# Patient Record
Sex: MALE | Race: WHITE | Employment: FULL TIME | URBAN - METROPOLITAN AREA
[De-identification: names, ages, dates, MRNs, and addresses within clinical notes are randomized per-mention and may not be internally consistent; named-entity substitution may affect disease eponyms.]

---

## 2018-03-02 LAB — ANTIBODY: NONREACTIVE

## 2019-05-15 ENCOUNTER — HOSPITAL ENCOUNTER (INPATIENT)
Age: 59
LOS: 2 days | Discharge: HOME | DRG: 249 | End: 2019-05-17
Attending: EMERGENCY MEDICINE | Admitting: INTERNAL MEDICINE
Payer: COMMERCIAL

## 2019-05-15 ENCOUNTER — APPOINTMENT (OUTPATIENT)
Dept: GENERAL RADIOLOGY | Age: 59
DRG: 249 | End: 2019-05-15
Payer: COMMERCIAL

## 2019-05-15 ENCOUNTER — APPOINTMENT (OUTPATIENT)
Dept: CARDIAC CATH/INVASIVE PROCEDURES | Age: 59
DRG: 249 | End: 2019-05-15
Payer: COMMERCIAL

## 2019-05-15 DIAGNOSIS — I21.3 ST ELEVATION MYOCARDIAL INFARCTION (STEMI), UNSPECIFIED ARTERY (HCC): Primary | ICD-10-CM

## 2019-05-15 LAB
ALBUMIN SERPL-MCNC: 4 G/DL (ref 3.4–5)
ALP BLD-CCNC: 81 U/L (ref 40–129)
ALT SERPL-CCNC: 63 U/L (ref 10–40)
AMPHETAMINE SCREEN, URINE: ABNORMAL
ANION GAP SERPL CALCULATED.3IONS-SCNC: 17 MMOL/L (ref 3–16)
AST SERPL-CCNC: 60 U/L (ref 15–37)
B-TYPE NATRIURETIC PEPTIDE: 96 PG/ML (ref 0–99.9)
BACTERIA: ABNORMAL /HPF
BARBITURATE SCREEN URINE: ABNORMAL
BASE EXCESS VENOUS: -10 (ref -3–3)
BASE EXCESS VENOUS: -5 (ref -3–3)
BASE EXCESS VENOUS: -7 (ref -3–3)
BASOPHILS ABSOLUTE: 0 K/UL (ref 0–0.2)
BASOPHILS ABSOLUTE: 0 K/UL (ref 0–0.2)
BASOPHILS RELATIVE PERCENT: 0 %
BASOPHILS RELATIVE PERCENT: 0.2 %
BENZODIAZEPINE SCREEN, URINE: ABNORMAL
BILIRUB SERPL-MCNC: 0.4 MG/DL (ref 0–1)
BILIRUBIN DIRECT: <0.2 MG/DL (ref 0–0.3)
BILIRUBIN URINE: NEGATIVE
BILIRUBIN, INDIRECT: ABNORMAL MG/DL (ref 0–1)
BLOOD, URINE: ABNORMAL
BUN BLDV-MCNC: 27 MG/DL (ref 7–20)
CALCIUM IONIZED: 1.13 MMOL/L (ref 1.12–1.32)
CALCIUM IONIZED: 1.13 MMOL/L (ref 1.12–1.32)
CALCIUM IONIZED: 1.16 MMOL/L (ref 1.12–1.32)
CALCIUM SERPL-MCNC: 8.2 MG/DL (ref 8.3–10.6)
CANNABINOID SCREEN URINE: ABNORMAL
CHLORIDE BLD-SCNC: 102 MMOL/L (ref 99–110)
CHOLESTEROL, TOTAL: 146 MG/DL (ref 0–199)
CLARITY: CLEAR
CO2: 17 MMOL/L (ref 21–32)
CO2: 21 MMOL/L (ref 21–32)
COCAINE METABOLITE SCREEN URINE: POSITIVE
COLOR: YELLOW
CREAT SERPL-MCNC: 2.3 MG/DL (ref 0.9–1.3)
CREATININE URINE: 176.3 MG/DL (ref 39–259)
EKG ATRIAL RATE: 94 BPM
EKG DIAGNOSIS: NORMAL
EKG P AXIS: 66 DEGREES
EKG P-R INTERVAL: 162 MS
EKG Q-T INTERVAL: 350 MS
EKG QRS DURATION: 94 MS
EKG QTC CALCULATION (BAZETT): 437 MS
EKG R AXIS: 68 DEGREES
EKG T AXIS: 81 DEGREES
EKG VENTRICULAR RATE: 94 BPM
EOSINOPHILS ABSOLUTE: 0 K/UL (ref 0–0.6)
EOSINOPHILS ABSOLUTE: 0 K/UL (ref 0–0.6)
EOSINOPHILS RELATIVE PERCENT: 0 %
EOSINOPHILS RELATIVE PERCENT: 0 %
EPITHELIAL CELLS, UA: ABNORMAL /HPF
GFR AFRICAN AMERICAN: 25
GFR AFRICAN AMERICAN: 35
GFR NON-AFRICAN AMERICAN: 21
GFR NON-AFRICAN AMERICAN: 29
GLUCOSE BLD-MCNC: 114 MG/DL (ref 70–99)
GLUCOSE BLD-MCNC: 114 MG/DL (ref 70–99)
GLUCOSE BLD-MCNC: 117 MG/DL (ref 70–99)
GLUCOSE BLD-MCNC: 180 MG/DL (ref 70–99)
GLUCOSE URINE: NEGATIVE MG/DL
HCO3 VENOUS: 19.6 MMOL/L (ref 23–29)
HCO3 VENOUS: 19.9 MMOL/L (ref 23–29)
HCO3 VENOUS: 21.6 MMOL/L (ref 23–29)
HCT VFR BLD CALC: 36.5 % (ref 40.5–52.5)
HCT VFR BLD CALC: 42.2 % (ref 40.5–52.5)
HCT VFR BLD CALC: 48.8 % (ref 40.5–52.5)
HDLC SERPL-MCNC: 36 MG/DL (ref 40–60)
HEMOGLOBIN: 11.9 G/DL (ref 13.5–17.5)
HEMOGLOBIN: 13.4 G/DL (ref 13.5–17.5)
HEMOGLOBIN: 15.2 G/DL (ref 13.5–17.5)
KETONES, URINE: ABNORMAL MG/DL
LACTATE: 1.45 MMOL/L (ref 0.4–2)
LACTATE: 10.4 MMOL/L (ref 0.4–2)
LACTATE: 2.5 MMOL/L (ref 0.4–2)
LDL CHOLESTEROL CALCULATED: 102 MG/DL
LEUKOCYTE ESTERASE, URINE: NEGATIVE
LV EF: 23 %
LVEF MODALITY: NORMAL
LYMPHOCYTES ABSOLUTE: 0.5 K/UL (ref 1–5.1)
LYMPHOCYTES ABSOLUTE: 0.8 K/UL (ref 1–5.1)
LYMPHOCYTES RELATIVE PERCENT: 2 %
LYMPHOCYTES RELATIVE PERCENT: 4.2 %
Lab: ABNORMAL
MAGNESIUM: 2 MG/DL (ref 1.8–2.4)
MCH RBC QN AUTO: 29.2 PG (ref 26–34)
MCH RBC QN AUTO: 29.3 PG (ref 26–34)
MCH RBC QN AUTO: 29.5 PG (ref 26–34)
MCHC RBC AUTO-ENTMCNC: 31.1 G/DL (ref 31–36)
MCHC RBC AUTO-ENTMCNC: 31.9 G/DL (ref 31–36)
MCHC RBC AUTO-ENTMCNC: 32.5 G/DL (ref 31–36)
MCV RBC AUTO: 90.1 FL (ref 80–100)
MCV RBC AUTO: 91.5 FL (ref 80–100)
MCV RBC AUTO: 94.6 FL (ref 80–100)
METHADONE SCREEN, URINE: ABNORMAL
MICROSCOPIC EXAMINATION: YES
MONOCYTES ABSOLUTE: 1.4 K/UL (ref 0–1.3)
MONOCYTES ABSOLUTE: 2.2 K/UL (ref 0–1.3)
MONOCYTES RELATIVE PERCENT: 7.4 %
MONOCYTES RELATIVE PERCENT: 9 %
NEUTROPHILS ABSOLUTE: 16.6 K/UL (ref 1.7–7.7)
NEUTROPHILS ABSOLUTE: 21.7 K/UL (ref 1.7–7.7)
NEUTROPHILS RELATIVE PERCENT: 88.2 %
NEUTROPHILS RELATIVE PERCENT: 89 %
NITRITE, URINE: NEGATIVE
O2 SAT, VEN: 23 %
O2 SAT, VEN: 76 %
O2 SAT, VEN: 83 %
OPIATE SCREEN URINE: ABNORMAL
OXYCODONE URINE: ABNORMAL
PCO2, VEN: 42.8 MM HG (ref 40–50)
PCO2, VEN: 46.2 MM HG (ref 40–50)
PCO2, VEN: 65.5 MM HG (ref 40–50)
PDW BLD-RTO: 13.7 % (ref 12.4–15.4)
PDW BLD-RTO: 13.9 % (ref 12.4–15.4)
PDW BLD-RTO: 15 % (ref 12.4–15.4)
PERFORMED ON: ABNORMAL
PERFORMED ON: NORMAL
PH UA: 6
PH UA: 6 (ref 5–8)
PH VENOUS: 7.08 (ref 7.35–7.45)
PH VENOUS: 7.28 (ref 7.35–7.45)
PH VENOUS: 7.28 (ref 7.35–7.45)
PHENCYCLIDINE SCREEN URINE: ABNORMAL
PLATELET # BLD: 246 K/UL (ref 135–450)
PLATELET # BLD: 305 K/UL (ref 135–450)
PLATELET # BLD: 393 K/UL (ref 135–450)
PMV BLD AUTO: 7.1 FL (ref 5–10.5)
PMV BLD AUTO: 7.4 FL (ref 5–10.5)
PMV BLD AUTO: 7.5 FL (ref 5–10.5)
PO2, VEN: 23 MM HG
PO2, VEN: 46 MM HG
PO2, VEN: 54 MM HG
POC ANION GAP: 17 (ref 10–20)
POC BUN: 30 MG/DL (ref 7–18)
POC CHLORIDE: 102 MMOL/L (ref 99–110)
POC CREATININE: 3.1 MG/DL (ref 0.9–1.3)
POC POTASSIUM: 4.7 MMOL/L (ref 3.5–5.1)
POC POTASSIUM: 4.9 MMOL/L (ref 3.5–5.1)
POC POTASSIUM: 5.1 MMOL/L (ref 3.5–5.1)
POC SAMPLE TYPE: ABNORMAL
POC SAMPLE TYPE: NORMAL
POC SODIUM: 139 MMOL/L (ref 136–145)
POC SODIUM: 139 MMOL/L (ref 136–145)
POC SODIUM: 140 MMOL/L (ref 136–145)
POTASSIUM REFLEX MAGNESIUM: 4.7 MMOL/L (ref 3.5–5.1)
PROPOXYPHENE SCREEN: ABNORMAL
PROTEIN UA: ABNORMAL MG/DL
RBC # BLD: 4.05 M/UL (ref 4.2–5.9)
RBC # BLD: 4.61 M/UL (ref 4.2–5.9)
RBC # BLD: 5.15 M/UL (ref 4.2–5.9)
RBC # BLD: NORMAL 10*6/UL
RBC UA: ABNORMAL /HPF (ref 0–2)
SODIUM BLD-SCNC: 136 MMOL/L (ref 136–145)
SODIUM URINE: 25 MMOL/L
SPECIFIC GRAVITY UA: 1.02 (ref 1–1.03)
TCO2 CALC VENOUS: 21 MMOL/L
TCO2 CALC VENOUS: 22 MMOL/L
TCO2 CALC VENOUS: 23 MMOL/L
TOTAL PROTEIN: 6.4 G/DL (ref 6.4–8.2)
TRIGL SERPL-MCNC: 40 MG/DL (ref 0–150)
TROPONIN: 0.05 NG/ML
TROPONIN: 0.11 NG/ML
TROPONIN: 0.18 NG/ML
URINE TYPE: ABNORMAL
UROBILINOGEN, URINE: 0.2 E.U./DL
VLDLC SERPL CALC-MCNC: 8 MG/DL
WBC # BLD: 13.6 K/UL (ref 4–11)
WBC # BLD: 18.8 K/UL (ref 4–11)
WBC # BLD: 24.4 K/UL (ref 4–11)
WBC UA: ABNORMAL /HPF (ref 0–5)

## 2019-05-15 PROCEDURE — 85347 COAGULATION TIME ACTIVATED: CPT

## 2019-05-15 PROCEDURE — 96374 THER/PROPH/DIAG INJ IV PUSH: CPT

## 2019-05-15 PROCEDURE — C1769 GUIDE WIRE: HCPCS

## 2019-05-15 PROCEDURE — B41F1ZZ FLUOROSCOPY OF RIGHT LOWER EXTREMITY ARTERIES USING LOW OSMOLAR CONTRAST: ICD-10-PCS | Performed by: INTERNAL MEDICINE

## 2019-05-15 PROCEDURE — 82947 ASSAY GLUCOSE BLOOD QUANT: CPT

## 2019-05-15 PROCEDURE — 6370000000 HC RX 637 (ALT 250 FOR IP): Performed by: EMERGENCY MEDICINE

## 2019-05-15 PROCEDURE — 92941 PRQ TRLML REVSC TOT OCCL AMI: CPT

## 2019-05-15 PROCEDURE — 81001 URINALYSIS AUTO W/SCOPE: CPT

## 2019-05-15 PROCEDURE — 83880 ASSAY OF NATRIURETIC PEPTIDE: CPT

## 2019-05-15 PROCEDURE — 99291 CRITICAL CARE FIRST HOUR: CPT

## 2019-05-15 PROCEDURE — 84132 ASSAY OF SERUM POTASSIUM: CPT

## 2019-05-15 PROCEDURE — 93005 ELECTROCARDIOGRAM TRACING: CPT | Performed by: EMERGENCY MEDICINE

## 2019-05-15 PROCEDURE — 99254 IP/OBS CNSLTJ NEW/EST MOD 60: CPT | Performed by: INTERNAL MEDICINE

## 2019-05-15 PROCEDURE — C1894 INTRO/SHEATH, NON-LASER: HCPCS

## 2019-05-15 PROCEDURE — 6360000004 HC RX CONTRAST MEDICATION: Performed by: INTERNAL MEDICINE

## 2019-05-15 PROCEDURE — 82803 BLOOD GASES ANY COMBINATION: CPT

## 2019-05-15 PROCEDURE — 84300 ASSAY OF URINE SODIUM: CPT

## 2019-05-15 PROCEDURE — 2000000000 HC ICU R&B

## 2019-05-15 PROCEDURE — 2580000003 HC RX 258: Performed by: STUDENT IN AN ORGANIZED HEALTH CARE EDUCATION/TRAINING PROGRAM

## 2019-05-15 PROCEDURE — 93458 L HRT ARTERY/VENTRICLE ANGIO: CPT

## 2019-05-15 PROCEDURE — 85025 COMPLETE CBC W/AUTO DIFF WBC: CPT

## 2019-05-15 PROCEDURE — C1887 CATHETER, GUIDING: HCPCS

## 2019-05-15 PROCEDURE — 80047 BASIC METABLC PNL IONIZED CA: CPT

## 2019-05-15 PROCEDURE — 02703DZ DILATION OF CORONARY ARTERY, ONE ARTERY WITH INTRALUMINAL DEVICE, PERCUTANEOUS APPROACH: ICD-10-PCS | Performed by: INTERNAL MEDICINE

## 2019-05-15 PROCEDURE — 80307 DRUG TEST PRSMV CHEM ANLYZR: CPT

## 2019-05-15 PROCEDURE — 6360000002 HC RX W HCPCS

## 2019-05-15 PROCEDURE — 71045 X-RAY EXAM CHEST 1 VIEW: CPT

## 2019-05-15 PROCEDURE — 82330 ASSAY OF CALCIUM: CPT

## 2019-05-15 PROCEDURE — C1725 CATH, TRANSLUMIN NON-LASER: HCPCS

## 2019-05-15 PROCEDURE — 83605 ASSAY OF LACTIC ACID: CPT

## 2019-05-15 PROCEDURE — 2709999900 HC NON-CHARGEABLE SUPPLY

## 2019-05-15 PROCEDURE — 2580000003 HC RX 258: Performed by: EMERGENCY MEDICINE

## 2019-05-15 PROCEDURE — 83735 ASSAY OF MAGNESIUM: CPT

## 2019-05-15 PROCEDURE — C8929 TTE W OR WO FOL WCON,DOPPLER: HCPCS

## 2019-05-15 PROCEDURE — 80061 LIPID PANEL: CPT

## 2019-05-15 PROCEDURE — 2580000003 HC RX 258: Performed by: INTERNAL MEDICINE

## 2019-05-15 PROCEDURE — 82570 ASSAY OF URINE CREATININE: CPT

## 2019-05-15 PROCEDURE — 36415 COLL VENOUS BLD VENIPUNCTURE: CPT

## 2019-05-15 PROCEDURE — 6370000000 HC RX 637 (ALT 250 FOR IP)

## 2019-05-15 PROCEDURE — C1876 STENT, NON-COA/NON-COV W/DEL: HCPCS

## 2019-05-15 PROCEDURE — 84484 ASSAY OF TROPONIN QUANT: CPT

## 2019-05-15 PROCEDURE — 6360000002 HC RX W HCPCS: Performed by: INTERNAL MEDICINE

## 2019-05-15 PROCEDURE — 84295 ASSAY OF SERUM SODIUM: CPT

## 2019-05-15 PROCEDURE — 2500000003 HC RX 250 WO HCPCS

## 2019-05-15 PROCEDURE — 85027 COMPLETE CBC AUTOMATED: CPT

## 2019-05-15 PROCEDURE — C1760 CLOSURE DEV, VASC: HCPCS

## 2019-05-15 PROCEDURE — 80076 HEPATIC FUNCTION PANEL: CPT

## 2019-05-15 PROCEDURE — 6370000000 HC RX 637 (ALT 250 FOR IP): Performed by: INTERNAL MEDICINE

## 2019-05-15 PROCEDURE — 4A023N7 MEASUREMENT OF CARDIAC SAMPLING AND PRESSURE, LEFT HEART, PERCUTANEOUS APPROACH: ICD-10-PCS | Performed by: INTERNAL MEDICINE

## 2019-05-15 RX ORDER — OXYCODONE AND ACETAMINOPHEN 10; 325 MG/1; MG/1
1 TABLET ORAL EVERY 4 HOURS PRN
COMMUNITY
End: 2021-11-12 | Stop reason: ALTCHOICE

## 2019-05-15 RX ORDER — SODIUM CHLORIDE 0.9 % (FLUSH) 0.9 %
10 SYRINGE (ML) INJECTION EVERY 12 HOURS SCHEDULED
Status: DISCONTINUED | OUTPATIENT
Start: 2019-05-15 | End: 2019-05-17 | Stop reason: HOSPADM

## 2019-05-15 RX ORDER — EPTIFIBATIDE 0.75 MG/ML
1 INJECTION, SOLUTION INTRAVENOUS CONTINUOUS
Status: DISPENSED | OUTPATIENT
Start: 2019-05-15 | End: 2019-05-15

## 2019-05-15 RX ORDER — SODIUM CHLORIDE 9 MG/ML
INJECTION, SOLUTION INTRAVENOUS CONTINUOUS
Status: DISCONTINUED | OUTPATIENT
Start: 2019-05-15 | End: 2019-05-15

## 2019-05-15 RX ORDER — SODIUM CHLORIDE 0.9 % (FLUSH) 0.9 %
10 SYRINGE (ML) INJECTION PRN
Status: DISCONTINUED | OUTPATIENT
Start: 2019-05-15 | End: 2019-05-17 | Stop reason: HOSPADM

## 2019-05-15 RX ORDER — CLOPIDOGREL BISULFATE 75 MG/1
75 TABLET ORAL DAILY
Status: DISCONTINUED | OUTPATIENT
Start: 2019-05-16 | End: 2019-05-17 | Stop reason: HOSPADM

## 2019-05-15 RX ORDER — HEPARIN SODIUM 5000 [USP'U]/ML
INJECTION, SOLUTION INTRAVENOUS; SUBCUTANEOUS
Status: COMPLETED
Start: 2019-05-15 | End: 2019-05-15

## 2019-05-15 RX ORDER — 0.9 % SODIUM CHLORIDE 0.9 %
500 INTRAVENOUS SOLUTION INTRAVENOUS ONCE
Status: COMPLETED | OUTPATIENT
Start: 2019-05-15 | End: 2019-05-15

## 2019-05-15 RX ORDER — ONDANSETRON 2 MG/ML
4 INJECTION INTRAMUSCULAR; INTRAVENOUS EVERY 6 HOURS PRN
Status: DISCONTINUED | OUTPATIENT
Start: 2019-05-15 | End: 2019-05-17 | Stop reason: HOSPADM

## 2019-05-15 RX ORDER — ASPIRIN 81 MG/1
324 TABLET, CHEWABLE ORAL ONCE
Status: COMPLETED | OUTPATIENT
Start: 2019-05-15 | End: 2019-05-15

## 2019-05-15 RX ORDER — ACETAMINOPHEN 325 MG/1
650 TABLET ORAL EVERY 4 HOURS PRN
Status: DISCONTINUED | OUTPATIENT
Start: 2019-05-15 | End: 2019-05-17 | Stop reason: HOSPADM

## 2019-05-15 RX ORDER — ASPIRIN 81 MG/1
81 TABLET, CHEWABLE ORAL DAILY
Status: DISCONTINUED | OUTPATIENT
Start: 2019-05-16 | End: 2019-05-17 | Stop reason: HOSPADM

## 2019-05-15 RX ORDER — ATORVASTATIN CALCIUM 20 MG/1
20 TABLET, FILM COATED ORAL DAILY
Status: ON HOLD | COMMUNITY
End: 2019-05-17 | Stop reason: HOSPADM

## 2019-05-15 RX ORDER — SODIUM CHLORIDE 9 MG/ML
INJECTION, SOLUTION INTRAVENOUS CONTINUOUS
Status: ACTIVE | OUTPATIENT
Start: 2019-05-15 | End: 2019-05-15

## 2019-05-15 RX ADMIN — EPTIFIBATIDE 1 MCG/KG/MIN: 0.75 INJECTION, SOLUTION INTRAVENOUS at 18:36

## 2019-05-15 RX ADMIN — HEPARIN SODIUM 4000 UNITS: 5000 INJECTION, SOLUTION INTRAVENOUS; SUBCUTANEOUS at 07:07

## 2019-05-15 RX ADMIN — SODIUM CHLORIDE: 9 INJECTION, SOLUTION INTRAVENOUS at 09:55

## 2019-05-15 RX ADMIN — SODIUM CHLORIDE: 0.9 INJECTION, SOLUTION INTRAVENOUS at 13:27

## 2019-05-15 RX ADMIN — Medication 10 ML: at 09:55

## 2019-05-15 RX ADMIN — ACETAMINOPHEN 650 MG: 325 TABLET ORAL at 19:59

## 2019-05-15 RX ADMIN — SODIUM CHLORIDE 500 ML: 9 INJECTION, SOLUTION INTRAVENOUS at 07:03

## 2019-05-15 RX ADMIN — SODIUM CHLORIDE: 9 INJECTION, SOLUTION INTRAVENOUS at 18:35

## 2019-05-15 RX ADMIN — ASPIRIN 324 MG: 81 TABLET, CHEWABLE ORAL at 06:55

## 2019-05-15 RX ADMIN — EPTIFIBATIDE 1 MCG/KG/MIN: 0.75 INJECTION, SOLUTION INTRAVENOUS at 08:30

## 2019-05-15 RX ADMIN — ACETAMINOPHEN 650 MG: 325 TABLET ORAL at 10:30

## 2019-05-15 RX ADMIN — PERFLUTREN 2.2 MG: 6.52 INJECTION, SUSPENSION INTRAVENOUS at 14:49

## 2019-05-15 ASSESSMENT — PAIN DESCRIPTION - PAIN TYPE
TYPE: ACUTE PAIN
TYPE: CHRONIC PAIN

## 2019-05-15 ASSESSMENT — PAIN DESCRIPTION - ONSET: ONSET: GRADUAL

## 2019-05-15 ASSESSMENT — ENCOUNTER SYMPTOMS
GASTROINTESTINAL NEGATIVE: 1
SHORTNESS OF BREATH: 1
EYES NEGATIVE: 1

## 2019-05-15 ASSESSMENT — PAIN SCALES - GENERAL
PAINLEVEL_OUTOF10: 3
PAINLEVEL_OUTOF10: 0
PAINLEVEL_OUTOF10: 0
PAINLEVEL_OUTOF10: 2
PAINLEVEL_OUTOF10: 0
PAINLEVEL_OUTOF10: 1
PAINLEVEL_OUTOF10: 5

## 2019-05-15 ASSESSMENT — PAIN DESCRIPTION - PROGRESSION: CLINICAL_PROGRESSION: GRADUALLY WORSENING

## 2019-05-15 ASSESSMENT — PAIN DESCRIPTION - DESCRIPTORS: DESCRIPTORS: HEADACHE

## 2019-05-15 ASSESSMENT — PAIN DESCRIPTION - LOCATION
LOCATION: GENERALIZED
LOCATION: HEAD

## 2019-05-15 ASSESSMENT — PAIN DESCRIPTION - FREQUENCY: FREQUENCY: INTERMITTENT

## 2019-05-15 ASSESSMENT — PAIN DESCRIPTION - ORIENTATION: ORIENTATION: ANTERIOR;OTHER (COMMENT)

## 2019-05-15 ASSESSMENT — PAIN - FUNCTIONAL ASSESSMENT: PAIN_FUNCTIONAL_ASSESSMENT: ACTIVITIES ARE NOT PREVENTED

## 2019-05-15 NOTE — ED PROVIDER NOTES
1 [facility]  EMERGENCY DEPARTMENT ENCOUNTER          ATTENDING PHYSICIAN NOTE       Date of evaluation: 5/15/2019    Chief Complaint     Drug Overdose      History of Present Illness     Melvin Baez is a 47 y.o. male who presents to the emergency department for presumed drug overdose. Patient apparently was found an hour prior to presentation looking pale and gasping for air. EMS was called and they did give him 4 mg of intranasal and 4 mg of IV Narcan for presumed narcotic overdose because the patient reportedly uses oxycodone. On arrival to the emergency department, the patient was placed on monitor and was noted to have ST segment elevations. He denies any chest pain but does note feeling short of breath. Review of Systems     Review of Systems   Constitutional: Negative. HENT: Negative. Eyes: Negative. Respiratory: Positive for shortness of breath. Cardiovascular: Negative. Gastrointestinal: Negative. Genitourinary: Negative. Musculoskeletal: Negative. Neurological: Negative. All other systems reviewed and are negative. Past Medical, Surgical, Family, and Social History     He has no past medical history on file. He has no past surgical history on file. His family history is not on file. He reports that he has never smoked. He does not have any smokeless tobacco history on file. He reports that he drank alcohol. He reports that he has current or past drug history. Medications     Previous Medications    No medications on file       Allergies     He has No Known Allergies. Physical Exam     INITIAL VITALS: BP: 106/85, Temp: 98.4 °F (36.9 °C), Pulse: 94, Resp: 16, SpO2: 93 %    Physical Exam   Constitutional: He is oriented to person, place, and time. He appears well-developed and well-nourished. HENT:   Head: Normocephalic and atraumatic. Mouth/Throat: Oropharynx is clear and moist. No oropharyngeal exudate.    Eyes: Pupils are equal, round, and reactive to light. Conjunctivae and EOM are normal. No scleral icterus. Neck: Normal range of motion. Neck supple. No JVD present. Cardiovascular: Normal rate, regular rhythm and normal heart sounds. Exam reveals no gallop and no friction rub. No murmur heard. Pulmonary/Chest: Effort normal.   Faint crackles present bilaterally. Abdominal: Soft. Bowel sounds are normal.   Musculoskeletal: Normal range of motion. He exhibits no edema. Lymphadenopathy:     He has no cervical adenopathy. Neurological: He is alert and oriented to person, place, and time. No cranial nerve deficit. He exhibits normal muscle tone. Coordination normal.   Skin: Skin is warm. He is diaphoretic. No erythema. Nursing note and vitals reviewed. Diagnostic Results     EKG   EKG is interpreted by me shows patient to be in a normal sinus rhythm with a normal axis, normal CO and QT intervals, normal QRS duration, there are ST elevations present in leads II, III and aVF as well as leads V5 and V6 with reciprocal changes in leads I and aVL.     RADIOLOGY:  XR CHEST PORTABLE   Final Result      Streaky bilateral lower lobe atelectasis or pneumonitis      No lobar consolidation          LABS:   Results for orders placed or performed during the hospital encounter of 05/15/19   POCT Venous   Result Value Ref Range    POC Sodium 140 136 - 145 mmol/L    POC Potassium 4.9 3.5 - 5.1 mmol/L    POC Chloride 102 99 - 110 mmol/L    CO2 21 21 - 32 mmol/L    POC Anion Gap 17 10 - 20    POC Glucose 180 (H) 70 - 99 mg/dl    POC BUN 30 (H) 7 - 18 mg/dL    POC Creatinine 3.1 (H) 0.9 - 1.3 mg/dL    GFR Non-African American 21 (A) >60    GFR African American 26 (A)     Calcium, Ion 1.13 1.12 - 1.32 mmol/L    Sample Type WINSTON     Performed on SEE BELOW    POCT Venous   Result Value Ref Range    pH, Winston 7.083 (LL) 7.350 - 7.450    pCO2, Winston 65.5 (H) 40.0 - 50.0 mm Hg    pO2, Winston 23 Not Established mm Hg    HCO3, Venous 19.6 (L) 23.0 - 29.0 mmol/L    Base Excess, Winston -10 (L) -3 - 3    O2 Sat, Winston 23 Not Established %    TC02 (Calc), Winston 22 Not Established mmol/L    Lactate 10.40 (HH) 0.40 - 2.00 mmol/L    Sample Type WINSTON     Performed on SEE BELOW    POCT Venous   Result Value Ref Range    BNP 96.0 0.0 - 99.9 pg/mL    Sample Type WINSTON     Performed on SEE BELOW      RECENT VITALS:  BP: 106/85,Temp: 98.4 °F (36.9 °C), Pulse: 94, Resp: 20, SpO2: 100 %     Procedures     N/A    ED Course     Nursing Notes, Past Medical Hx, Past Surgical Hx, Social Hx,Allergies, and Family Hx were reviewed. The patient was given the following medications:  Orders Placed This Encounter   Medications    aspirin chewable tablet 324 mg    0.9 % sodium chloride bolus    heparin (porcine) 5000 UNIT/ML injection     Maribel Pereyra: cabinet override       CONSULTS:  IP CONSULT TO HOSPITALIST    MEDICAL DECISIONMAKING / ASSESSMENT / Jet Katy is a 47 y.o. male who presents to the emergency department for shortness of breath. Patient was presumed to be an overdose of oxycodone and received a total of 8 mg of Narcan. On arrival, the patient is pale appearing and diaphoretic. EKG shows ST segment elevations in the inferior leads with reciprocal changes. With this finding, the patient was given aspirin. Cath Lab was activated. I did speak with cardiology who will take the patient emergently to the cath lab. They did ask that the patient be admitted to the hospitalist service in the intensive care unit. Calls been placed to the services. Chest x-ray by my read showed no mediastinal widening so patient was given a bolus of heparin. Initial VBG does show a respiratory acidosis and elevated lactate which may be related to possible overdose as well as creatinine of 3.1 with no baseline available so this will need to be monitored by the inpatient service. .    Critical Care:  Due to the immediate potential for life-threatening deterioration due to STEMI, I spent 35 minutes providing critical care. Thistime excludes time spent performing procedures but includes time spent on direct patient care, history retrieval, review of the chart, and discussions with patient, family, and consultant(s). Clinical Impression     1. ST elevation myocardial infarction (STEMI), unspecified artery (HCC)        Disposition     PATIENT REFERRED TO:  No follow-up provider specified.     DISCHARGE MEDICATIONS:  New Prescriptions    No medications on file       DISPOSITION Decision To Admit 05/15/2019 07:13:23 AM        Yon Palacios MD  05/15/19 9910

## 2019-05-15 NOTE — H&P
Internal Medicine  History and Physical    Admit Date: 5/15/2019   MRN: [id number]   PCP: No primary care provider on file. CC:    Chief Complaint   Patient presents with    Drug Overdose       HPI:    Toney Garza is a 47 y.o.  male with no known PMHx who presents with a few hour hx of chest pain and severe SOB. EMS called to pt's house by roommates who found him non-responsive. He was given intranasal and IV narcan for presumed opiate overdose as it was reported that he takes oxycodone for chronic pain after a vertebral fx several years ago. On arrival to ER, pt found to have ST elevations on EKG. Cath lab activated. Pt stented with BMS x1 to the RCA by Dr. Ricco Black. He will be admitted to the ICU on ASA and Integrilin (eptifibatide). ED labs show renal failure, elevated lactate, and leukocytosis to 24.4 which may not all be explained by his acute STEMI. He received IVF ~1L in the cath lab and ED. Upon admission, pt more awake and alert. He confirms taking percocet 10-325mg QID for pain and is followed by pain management, Dr. Argentina Spicer, of [address] [place] [address]. Also reports recreational use of cocaine typically for celebratory purposes. Last use yesterday evening. He denies any current chest pain or SOB. He is unaware of any other medical issues he has. Past Medical:    History reviewed. No pertinent past medical history. Medications Prior to Admission:    No current facility-administered medications on file prior to encounter. No current outpatient medications on file prior to encounter. Allergies:  Patient has no known allergies. Surgical Hx:   History reviewed. No pertinent surgical history.       Social History:   Social History     Tobacco History     Smoking Status  Never Smoker    Smokeless Tobacco Use  Unknown          Alcohol History     Alcohol Use Status  Not Currently          Drug Use     Drug Use Status  Not Currently          Sexual Activity Sexually Active  Not Asked              Drugs:   Cocaine recreational  Employment:   Construction (nights)  Patient currently lives with two female roommates      Family History:   Numerous members have htn           ROS:   Review of Systems   Respiratory: Positive for shortness of breath. Cardiovascular: Positive for chest pain. PHYSICAL EXAM:  /85   Pulse 94   Temp 98.4 °F (36.9 °C) (Axillary)   Resp 20   Ht 5' 10\" (1.778 m)   Wt 205 lb (93 kg)   SpO2 100%   BMI 29.41 kg/m²   Physical Exam   Constitutional: He is oriented to person, place, and time. He appears well-developed and well-nourished. No distress. HENT:   Head: Normocephalic and atraumatic. Eyes: Pupils are equal, round, and reactive to light. EOM are normal.   Neck: Normal range of motion. Neck supple. Cardiovascular: Normal rate, regular rhythm, normal heart sounds and intact distal pulses. Exam reveals no gallop and no friction rub. No murmur heard. Pulmonary/Chest: Effort normal and breath sounds normal. No respiratory distress. He has no wheezes. He has no rales. He exhibits no tenderness. Abdominal: Soft. Bowel sounds are normal. He exhibits no distension and no mass. There is no tenderness. There is no guarding. Musculoskeletal: He exhibits no edema, tenderness or deformity. Neurological: He is alert and oriented to person, place, and time. Skin: Skin is warm and dry. No rash noted. He is not diaphoretic. No erythema. No pallor. Psychiatric: He has a normal mood and affect. Vitals reviewed. RADIOLOGY / OTHER PROCEDURES:  XR CHEST PORTABLE   Final Result      Streaky bilateral lower lobe atelectasis or pneumonitis      No lobar consolidation                Assessment & Plan:      STEMI  S/p PCI with placement of BMS x1 to the RCA. Mild-mod LAD stenosis <50%. Placed on ASA and Integrilin.     - will obtain ECHO   - serial troponin to follow for resolution  - DAPT:  ASA and Integrilin gtt - will transition to ASA / Plavix on discharge    NIKKIE vs chronic renal failure  Hx unknown. Cr 3.1 on POC. Will recheck. - check serum and urine Na and Creat  - will consult nephro if no improvement after fluid bolus    Lactic acidosis  Likely from narcotic overdose. Normotensive, afebrile, but with leukocytosis. CXR with bibasilar atelectasis, no consolidation.   - follow LA  - IVF @ 100  - will check labs CBC, BMP, LFTs    Cocaine use  Recreational for celebratory purposes. Last use yesterday evening.   - advised to stop all cocaine use     Nicotine dependence  Chewing tobacco hx.  - advised to stop      Disposition:  ICU  Code status:  No Order      Discussed with attending:  Jacquie Swain MD        Signed,    Johnny Elder MD PGY3  Internal Medicine resident    8:53 AM    Patient seen and examined, labs and imaging studies reviewed, agree with assessment and plan as outlined above. Continue with current care and plan as outlined above. Discussed with family at bedside.      Jacquie Swain MD [facility]

## 2019-05-15 NOTE — LETTER
[address]  [address] [facility]. [place] [address]  Phone: [phone]             May 17, 2019    Patient: Audi Berrios   YOB: 1960   Date of Visit: 5/15/2019       To Whom It May Concern:    Audi Berrios was seen and treated in our facility  beginning 5/15/2019 until 5/17/19 . He may return to work on 5/18/19, resuming light duties only. Can lift anything 10lbs or greater until 5/25/19.      Sincerely,       No Higgins MD         Signature:__________________________________

## 2019-05-15 NOTE — LETTER
[address]  [address] [facility] [address]  Phone: [phone]             May 17, 2019    Patient: Rob Nascimento   YOB: 1960   Date of Visit: 5/15/2019       To Whom It May Concern:    Rob Nascimento was seen and treated in our facility  beginning 5/15/2019 until .  He may return to work on 5/24/19 resuming light duties until evaluated by cardiologist.      Sincerely,       Toshia Hinson MD         Signature:__________________________________

## 2019-05-15 NOTE — CONSULTS
Procedure: LHC, PTCA/Stent RCA, angioseal RFA  Complication: none  Preliminary:    LV: no LV done to conserve contrast.     LM ok. LAD with Prox 40% and mid 40%. Intermediate mild diffuse disease,    CX small no obstructive disease. RCA distal filling defect and subtotally occluded    Successful PTCA/Stent distal RCA (Bare metal stent.)  Successful angioseal RFA.

## 2019-05-15 NOTE — PROCEDURES
[address] [facility]               [address]                            CARDIAC CATHETERIZATION    PATIENT NAME: Thuan Sinclair                        :        01/10/1966  MED REC NO:   [id number]                          ROOM:       4520  ACCOUNT NO:   [de-identified]                           ADMIT DATE: 05/15/2019  PROVIDER:     Jaciel Mckeon MD      DATE OF PROCEDURE:  05/15/2019    PROCEDURES PERFORMED:  Left heart catheterization, left coronary  cineangiography, PCI of the right coronary artery, and also Angio-Seal  of the right femoral arteriotomy site. HISTORY:  The patient is 64-year-old white male with no prior cardiac  history and for that matter no documented medical history, who presents  to the emergency room with shortness of breath. Apparently, awakened  this morning with severe shortness of breath. He was apparently gasping  when the EMTs arrived, then he was given Narcan. Upon arrival to the  emergency room, he was noted to have acute marked ST elevation on his  monitor. EKG confirmed an inferior myocardial infarction. He has noted  no prior cardiac history. He notes no exertional chest pain. He noted  no chest pain this morning. He was brought emergently to cardiac  catheterization lab for intervention. It was noted he had elevated  creatinine; however, in view of his acute situation, it was felt that  intervention was priority. TECHNICAL PROCEDURE:  The patient was brought to the cardiac  catheterization lab emergently on 05/15/2019 where the right femoral  area was prepped and draped in the usual sterile fashion. After  anesthetizing the area with 2% lidocaine, a 6-Somali sheath was placed  in the right femoral artery using Seldinger technique. Subsequently,  selective coronary cineangiography of both the left and right coronaries  performed in multiple projections.   This was performed using 5-Somali  JL5 and cineangiography. LEFT VENTRICULOGRAM:  No LV gram was performed to conserve contrast.    LEFT MAIN:  The left main was free of significant obstructive disease. LEFT ANTERIOR DESCENDING:  The LAD courses to and wraps around the apex. It gives off small first diagonal branch followed by moderate  distribution bifurcating second diagonal and several distal diagonal  branches. In the proximal portion of the vessel, there was 30% to 40%  narrowing. At the level of the first diagonal branch, there is an area  of narrowing, which also includes the diagonal branch for which  approaches 40%. INTERMEDIATE RAMUS BRANCH:  Intermediate is extremely large vessel, but  he has multiple terminal divisions of the inferior wall. He has a mild  diffuse disease, but no areas greater than 40%. LEFT CIRCUMFLEX:  Circumflex is relatively small vessel in view of the  large intermediate. He has several very small marginal branches and  terminates to small posterolateral branch. He has history of  significant obstructive disease. RIGHT CORONARY ARTERY:  Right coronary artery is a very large dominant  vessel. It gives off a large PDA and two large distal posterolateral  branches. This is diffusely diseased throughout. This was somewhat  ectatic. In the distal portion of the vessel, there is a filling  defect. RIGHT CORONARY POST INTERVENTION:  After PTCA and stent placement, there  is minimal if any residual stenosis. There was normal antegrade flow  and no evidence of collateral dissection or stain noted. IMPRESSION:  1. Acute inferior ST-elevation myocardial infarction. 2.  Essentially single-vessel coronary artery disease. 3.  Successful PTCA and stent placement in the right coronary artery. 4.  Successful Angio-Seal of the right femoral arteriotomy site.         Josh Santana MD    D: 05/15/2019 8:15:15       T: 05/15/2019 9:58:32     [email or web address]  Job#: [id number]     Doc#: [id number]    CC:

## 2019-05-15 NOTE — CONSULTS
History of Present Illness:  Robi Guido is a 47 y.o. patient whom we were asked by ER to see for STEMI. Awoke this am with severe sob. Was apparently gasping when EMT arrived and given narcan. Brought into ER and noted marked ST elevation. No chest pain. Only sob. No prior cardiac hx. No previous medical hx or surgical hx per his hx. Past Medical History:   has no past medical history on file. Surgical History:   has no past surgical history on file. Social History:   reports that he has never smoked. He does not have any smokeless tobacco history on file. He reports that he drank alcohol. He reports that he has current or past drug history. Family History:  No evidence for sudden cardiac death or premature CAD    Home Medications:  Were reviewed and are listed in nursing record. and/or listed below  Prior to Admission medications    Not on File        Allergies:  Patient has no known allergies.      Review of Systems:   · Unable due to emergent nature    Physical Examination:    Vitals:    05/15/19 0708   BP: 106/85   Pulse: 94   Resp: 20   Temp: 98.4 °F (36.9 °C)   SpO2: 100%    Weight: 205 lb (93 kg)         General Appearance:  Blunted, cooperative, no distress, appears stated age   Head:  Normocephalic, without obvious abnormality, atraumatic   Eyes:  Conjunctiva/corneas clear       Nose: Nares normal   Throat: Lips normal   Neck: Supple, symmetrical, trachea midline       Lungs:   Decreased BS   Chest Wall:  No tenderness or deformity   Heart:  Regular rate and rhythm, S1, S2 normal, no murmur, rub or gallop   Abdomen:   Soft, non-tender, bowel sounds active all four quadrants,             Extremities: Extremities normal, atraumatic, no cyanosis or edema       Skin: Skin color, texture, turgor normal, no rashes or lesions   Pysch: Blunted   Neurologic: Normal gross motor and sensory exam.         Labs  CBC:   Lab Results   Component Value Date    WBC 24.4 05/15/2019    RBC 5.15 05/15/2019    HGB 15.2 05/15/2019    HCT 48.8 05/15/2019    MCV 94.6 05/15/2019    RDW 15.0 05/15/2019     05/15/2019     CMP:    Lab Results   Component Value Date    CO2 21 05/15/2019    CREATININE 3.1 05/15/2019    GFRAA 26 05/15/2019    LABGLOM 21 05/15/2019     PT/INR:  No results found for: PTINR  No results found for: CKTOTAL, CKMB, CKMBINDEX, TROPONINI    EKG:  I have reviewed EKG with the following interpretation:  Impression:  Sinus  Acute inferior mi    Assessment  Patient Active Problem List   Diagnosis    STEMI (ST elevation myocardial infarction) ([facility] [address]         Plan:    Acute inferior MI. ST elevation inferiorly. Rec emergent cath.

## 2019-05-15 NOTE — PROGRESS NOTES
4 Eyes Admission Assessment     I agree as the admission nurse that 2 RN's have performed a thorough Head to Toe Skin Assessment on the patient. ALL assessment sites listed below have been assessed on admission. Areas assessed by both nurses: all  [x]   Head, Face, and Ears   [x]   Shoulders, Back, and Chest  [x]   Arms, Elbows, and Hands   [x]   Coccyx, Sacrum, and Ischum  [x]   Legs, Feet, and Heels        Does the Patient have Skin Breakdown?   No         Peng Prevention initiated:  No   Wound Care Orders initiated:  No      [facility] nurse consulted for Pressure Injury (Stage 3,4, Unstageable, DTI, NWPT, and Complex wounds):  No      Nurse 1 eSignature: Electronically signed by Hema Khanna RN on 5/15/19 at 5:35 PM    **SHARE this note so that the co-signing nurse is able to place an eSignature**    Nurse 2 eSignature: {Esignature:[id number]}

## 2019-05-15 NOTE — LETTER
[address]  [address] [facility] [address]  Phone: [phone]             May 17, 2019    Patient: Melvin Baez   YOB: 1960   Date of Visit: 5/15/2019       To Whom It May Concern:    Melvin Baez was seen and treated in our facility  beginning 5/15/2019 until . He may return to work on 5/18/19. He may resume light duties but cannot lift anything 10lbs or greater until 5/25/19.        Sincerely,       Katelynn Curran MD         Signature:__________________________________

## 2019-05-16 PROBLEM — I42.0 DILATED CARDIOMYOPATHY (HCC): Status: ACTIVE | Noted: 2019-05-16

## 2019-05-16 PROBLEM — Z98.61 POST PTCA: Status: ACTIVE | Noted: 2019-05-16

## 2019-05-16 LAB
ABO/RH: NORMAL
ANION GAP SERPL CALCULATED.3IONS-SCNC: 9 MMOL/L (ref 3–16)
ANTIBODY SCREEN: NORMAL
BUN BLDV-MCNC: 24 MG/DL (ref 7–20)
CALCIUM SERPL-MCNC: 8.2 MG/DL (ref 8.3–10.6)
CHLORIDE BLD-SCNC: 103 MMOL/L (ref 99–110)
CO2: 24 MMOL/L (ref 21–32)
CREAT SERPL-MCNC: 1.1 MG/DL (ref 0.9–1.3)
GFR AFRICAN AMERICAN: >60
GFR NON-AFRICAN AMERICAN: >60
GLUCOSE BLD-MCNC: 91 MG/DL (ref 70–99)
HCT VFR BLD CALC: 34 % (ref 40.5–52.5)
HEMOGLOBIN: 10.9 G/DL (ref 13.5–17.5)
MAGNESIUM: 2.1 MG/DL (ref 1.8–2.4)
MCH RBC QN AUTO: 29.1 PG (ref 26–34)
MCHC RBC AUTO-ENTMCNC: 32.1 G/DL (ref 31–36)
MCV RBC AUTO: 90.8 FL (ref 80–100)
PDW BLD-RTO: 13.8 % (ref 12.4–15.4)
PLATELET # BLD: 207 K/UL (ref 135–450)
PMV BLD AUTO: 7.3 FL (ref 5–10.5)
POC ACT LR: 168 SEC
POC ACT LR: 183 SEC
POTASSIUM REFLEX MAGNESIUM: 3.9 MMOL/L (ref 3.5–5.1)
RBC # BLD: 3.74 M/UL (ref 4.2–5.9)
SODIUM BLD-SCNC: 136 MMOL/L (ref 136–145)
WBC # BLD: 9.8 K/UL (ref 4–11)

## 2019-05-16 PROCEDURE — 85027 COMPLETE CBC AUTOMATED: CPT

## 2019-05-16 PROCEDURE — 1200000000 HC SEMI PRIVATE

## 2019-05-16 PROCEDURE — 6370000000 HC RX 637 (ALT 250 FOR IP): Performed by: INTERNAL MEDICINE

## 2019-05-16 PROCEDURE — 2580000003 HC RX 258: Performed by: INTERNAL MEDICINE

## 2019-05-16 PROCEDURE — 80048 BASIC METABOLIC PNL TOTAL CA: CPT

## 2019-05-16 PROCEDURE — 86850 RBC ANTIBODY SCREEN: CPT

## 2019-05-16 PROCEDURE — 86901 BLOOD TYPING SEROLOGIC RH(D): CPT

## 2019-05-16 PROCEDURE — 83735 ASSAY OF MAGNESIUM: CPT

## 2019-05-16 PROCEDURE — 99233 SBSQ HOSP IP/OBS HIGH 50: CPT | Performed by: INTERNAL MEDICINE

## 2019-05-16 PROCEDURE — 86900 BLOOD TYPING SEROLOGIC ABO: CPT

## 2019-05-16 PROCEDURE — 36415 COLL VENOUS BLD VENIPUNCTURE: CPT

## 2019-05-16 RX ORDER — ATORVASTATIN CALCIUM 40 MG/1
40 TABLET, FILM COATED ORAL NIGHTLY
Status: DISCONTINUED | OUTPATIENT
Start: 2019-05-16 | End: 2019-05-17 | Stop reason: HOSPADM

## 2019-05-16 RX ADMIN — ASPIRIN 81 MG 81 MG: 81 TABLET ORAL at 09:17

## 2019-05-16 RX ADMIN — Medication 10 ML: at 21:29

## 2019-05-16 RX ADMIN — ATORVASTATIN CALCIUM 40 MG: 40 TABLET, FILM COATED ORAL at 21:29

## 2019-05-16 RX ADMIN — CLOPIDOGREL BISULFATE 75 MG: 75 TABLET ORAL at 09:17

## 2019-05-16 ASSESSMENT — PAIN SCALES - GENERAL
PAINLEVEL_OUTOF10: 0

## 2019-05-16 NOTE — CONSULTS
ICU Progress Note    Admit Date: 5/15/2019  Hospital Day: 2    ICU DAY  Code:Full Code  PCP: No primary care provider on file. SUBJECTIVE:   Interval Hx: NAEON. Rt groin site looks good. Distal pulses intact. No chest pain this morning, shortness of breath or abdominal pain. NIKKIE and lactic acidosis resolved with IVF. Will start plavix this morning. MEDICATIONS:   Scheduled Meds:   sodium chloride flush  10 mL Intravenous 2 times per day    clopidogrel  75 mg Oral Daily    aspirin  81 mg Oral Daily      Continuous Infusions:  PRN Meds:sodium chloride flush, magnesium hydroxide, ondansetron, acetaminophen  Allergies: No Known Allergies    PHYSICAL EXAM:       Vitals: BP 96/76   Pulse 54   Temp 98.3 °F (36.8 °C) (Oral)   Resp 21   Ht 5' 10\" (1.778 m)   Wt 206 lb 2.1 oz (93.5 kg)   SpO2 97%   BMI 29.58 kg/m²   I/O:      Intake/Output Summary (Last 24 hours) at 5/16/2019 0655  Last data filed at 5/16/2019 0600  Gross per 24 hour   Intake 2443 ml   Output 525 ml   Net 1918 ml     I/O this shift:  In: 1428 [I.V.:1428]  Out: -   I/O last 3 completed shifts: In: 7205 [P.O.:360; I.V.:655]  Out: 525 [Urine:525]    Physical Examination:   · General appearance: alert, appears stated age and cooperative  · Skin: Skin color, texture, turgor normal.   · HEENT: PERRLA: Normocephalic, no lesions, without obvious abnormality. · Neck: no adenopathy, no carotid bruit, no JVD, supple, symmetrical, trachea midline and thyroid not enlarged, symmetric, no tenderness/mass/nodules  · Lungs: clear to auscultation bilaterally  · Heart: slightly bradycardic with regular rhythm, S1, S2 normal, no murmur, click, rub or gallop  · Abdomen: soft, non-tender; bowel sounds normal; no masses,  no organomegaly  · Extremities: extremities normal, atraumatic, no cyanosis or edema  · -Rt groin site clean, intact, no hematoma  · Lymphatic: No significant lymph node enlargement papable  · Neurologic: CN II-XII grossly intact.   Mental status: Alert, oriented, thought content appropriate      DATA:       Labs:  CBC:   Recent Labs     05/15/19  1042 05/15/19  2149 05/16/19  0625   WBC 18.8* 13.6* 9.8   HGB 13.4* 11.9* 10.9*   HCT 42.2 36.5* 34.0*    246 207       BMP:   Recent Labs     05/15/19  0702 05/15/19  1042   NA  --  136   K  --  4.7   CL  --  102   CO2 21 17*   BUN  --  27*   CREATININE 3.1* 2.3*   GLUCOSE  --  114*     ABGs: No results for input(s): PHART, KIY3KYP, PO2ART in the last 72 hours. ASSESSMENT AND PLAN:   Mr. Ammy Thomas is a 64 yo M with no significant PMHx who presents with Inferior STEMI. Inferior STEMI s/p PCI  S/p PCI with placement of BMS x1 to the RCA. Mild-mod LAD stenosis <50%. Placed on ASA and Integrilin.    - ECHO: EF 20-25%, severe global L ventricular hypokinesis, grade 1 diastolic dysfxn  - DAPT:  ASA and Integrilin gtt    -will transition to ASA / Plavix today  -bradycardia and borderline hypotension limit the use of beta blockers     NIKKIE (resolving)  -Cr 3.1 on admission  -Cr 1.1 this morning s/p IVF     Lactic acidosis (resolved)  Likely from narcotic overdose. Normotensive, afebrile, but with leukocytosis. CXR with bibasilar atelectasis, no consolidation. - 10 > 1  - s/p IVF     Recreational Cocaine use  Recreational for celebratory purposes.   Last use yesterday evening.   - advised to stop all cocaine use      Nicotine dependence  Chewing tobacco hx.  - cessation counseling      Diet: Cardiac  Dispo: ICU  Code: full    W/D/W/A  -----------------------------  Cheyenne Henning M.D.   PGY-1 Internal Medicine  Reached via [facility]  5/16/2019 6:55 AM

## 2019-05-16 NOTE — PROGRESS NOTES
[address] Daily Progress Note      Admit Date:  5/15/2019    Subjective:  Mr. Mariaelena Rey was see and examined. F/U STEMI/CAD/PTCA/Cardiomyopathy. Feeling much better this am.  No chest pain. No further sob. Objective:   /79   Pulse 67   Temp 98.6 °F (37 °C) (Oral)   Resp 15   Ht 5' 10\" (1.778 m)   Wt 206 lb 2.1 oz (93.5 kg)   SpO2 98%   BMI 29.58 kg/m²       Intake/Output Summary (Last 24 hours) at 5/16/2019 0954  Last data filed at 5/16/2019 0600  Gross per 24 hour   Intake 2443 ml   Output 525 ml   Net 1918 ml       TELEMETRY: Sinus     Physical Exam:  General:  Awake, alert, NAD  Skin:  Warm and dry  Neck:  JVP not elevated  Chest:  Decreased BS, respiration normal  Cardiovascular:  RRR S1S2  Abdomen:  Soft nontender  Extremities:  no edema    Medications:    atorvastatin  40 mg Oral Nightly    sodium chloride flush  10 mL Intravenous 2 times per day    clopidogrel  75 mg Oral Daily    aspirin  81 mg Oral Daily       sodium chloride flush, magnesium hydroxide, ondansetron, acetaminophen    Lab Data:  CBC:   Recent Labs     05/15/19  1042 05/15/19  2149 05/16/19  0625   WBC 18.8* 13.6* 9.8   HGB 13.4* 11.9* 10.9*   HCT 42.2 36.5* 34.0*   MCV 91.5 90.1 90.8    246 207     BMP:   Recent Labs     05/15/19  0702 05/15/19  1042 05/16/19  0625   NA  --  136 136   K  --  4.7 3.9   CL  --  102 103   CO2 21 17* 24   BUN  --  27* 24*   CREATININE 3.1* 2.3* 1.1     LIVER PROFILE:   Recent Labs     05/15/19  1042   AST 60*   ALT 63*   BILIDIR <0.2   BILITOT 0.4   ALKPHOS 81     PT/INR: No results for input(s): PROTIME, INR in the last 72 hours. APTT: No results for input(s): APTT in the last 72 hours. BNP:    Recent Labs     05/15/19  0701   BNP 96.0     IMAGING:     Assessment:  Patient Active Problem List    Diagnosis Date Noted    Post PTCA 05/16/2019    Dilated cardiomyopathy ([facility] [address] 05/16/2019    STEMI (ST elevation myocardial infarction) ([facility] 05/15/2019       Plan:  1.  Feeling

## 2019-05-16 NOTE — PROGRESS NOTES
Patient transferred to room 4453 per wheelchair. Report called to the nurse. Belongings with the patient.

## 2019-05-16 NOTE — PROGRESS NOTES
ICU TRANSFER NOTE to [facility]      2:03 PM2019  Admit Date: 5/15/2019   Hospital Day: 2                                                   PCP   No primary care provider on file. : 1960                                                       CodeStatus:Full Code  MRN: [id number]                                                        Diet: DIET CARDIAC;     Mr. Salome Nation is a 46 yo M with PMHx significant for recreational drug use (cocaine)  who presented to [facility] after being found unresponsive by his roommates. According to intake reports, the patient had been complaining of SOB and CP several hours prior to presentation. He was given narcan en route to the ED for presumed opiate overdose. While in the ED, patient was found to have ST elevations on EKG. He was immediately sent to the cath lab and was stented with BMS x 1 to the RCA. Additionally, patient was noted to have NIKKIE, lactic acidosis, and leukocytosis to 24.4. Today, patient doing well post-cath. He denies CP, SOB, or abdominal pain. NIKKIE and lactic acidosis resolved s/p IVFs. Patient currently on ASA, plavix and lipitor. Blood pressures on the softer side but responsive to fluids. Cardiology on board. Vitals:    19 0900   BP: 116/79   Pulse: 67   Resp: 15   Temp: 98.6 °F (37 °C)   SpO2: 98%     Scheduled Meds:   atorvastatin  40 mg Oral Nightly    sodium chloride flush  10 mL Intravenous 2 times per day    clopidogrel  75 mg Oral Daily    aspirin  81 mg Oral Daily     Continuous Infusions:  PRN Meds:sodium chloride flush, magnesium hydroxide, ondansetron, acetaminophen    Physical Exam  · General appearance: alert, appears stated age and cooperative  · Skin: Skin color, texture, turgor normal.   · HEENT: PERRLA: Normocephalic, no lesions, without obvious abnormality.   · Neck: no adenopathy, no carotid bruit, no JVD, supple, symmetrical, trachea midline and thyroid not enlarged, symmetric, no tenderness/mass/nodules  · Lungs: Crackles appreciated in right middle lung field, otherwise CTAB. · Heart: slightly bradycardic with regular rhythm, S1, S2 normal, no murmur, click, rub or gallop  · Abdomen: soft, non-tender; bowel sounds normal; no masses,  no organomegaly  · Extremities: extremities normal, atraumatic, no cyanosis or edema  ? -Rt groin site clean, intact, no hematoma  · Lymphatic: No significant lymph node enlargement papable  · Neurologic: CN II-XII grossly intact. Mental status: Alert, oriented, thought content appropriate      Mr. Sara Thurman is a 62 yo M with no significant PMHx who presents with Inferior STEMI. Inferior STEMI s/p PCI  S/p PCI with placement of BMS x1 to the RCA.  Mild-mod LAD stenosis <50%.  Placed on ASA and Integrilin.    - ECHO: EF 20-25%, severe global L ventricular hypokinesis, grade 1 diastolic dysfxn  - DAPT:  ASA and Integrilin gtt               -will transition to ASA / Plavix today  -bradycardia and borderline hypotension limit the use of beta blockers     NIKKIE (resolving)  -Cr 3.1 on admission  -Cr 1.1 this morning s/p IVF     Lactic acidosis (resolved)  Likely from narcotic overdose.  Normotensive, afebrile, but with leukocytosis.  CXR with bibasilar atelectasis, no consolidation.   - 10 > 1  - s/p IVF     Recreational Cocaine use  Recreational for celebratory purposes.  Last use yesterday evening.   - advised to stop all cocaine use      Nicotine dependence  Chewing tobacco hx.  - cessation counseling      Diet: Cardiac  Dispo: ICU  Code: full        The objective and subjective findings as well as the treatment course in the cook have been reviewed with the floor team. The treatment plan has been reviewed with the floor team. The patient is being transferred to the [facility] in stable condition. Cuca Cadet, PGY-1  05/16/19  2:03 PM    Patient seen and examined, labs and imaging studies reviewed, agree with assessment and plan as outlined above. Continue with current care and plan as outlined above. Greater than 35 minutes spent on case over half face to face.       MD Roni Stone

## 2019-05-16 NOTE — PROGRESS NOTES
Patient transferred from ICU to room 4453. VSS on RA. Right groin site WDL. Patient up ad sukhjinder, steady gait. Friend at bedside. Skin assessment completed with HAL Silva. No further needs noted at this time.

## 2019-05-16 NOTE — PROGRESS NOTES
4 Eyes Admission Assessment     I agree as the admission nurse that 2 RN's have performed a thorough Head to Toe Skin Assessment on the patient. ALL assessment sites listed below have been assessed on admission. Areas assessed by both nurses:  [x]   Head, Face, and Ears   [x]   Shoulders, Back, and Chest  [x]   Arms, Elbows, and Hands   [x]   Coccyx, Sacrum, and Ischum  [x]   Legs, Feet, and Heels        Does the Patient have Skin Breakdown?   No         Peng Prevention initiated:  No   Wound Care Orders initiated:  No      [facility] nurse consulted for Pressure Injury (Stage 3,4, Unstageable, DTI, NWPT, and Complex wounds):  No      Nurse 1 eSignature: Electronically signed by Armin Nevarez RN on 5/16/19 at 2:21 PM    **SHARE this note so that the co-signing nurse is able to place an eSignature**    Nurse 2 eSignature: Electronically signed by Clari Montes RN on 5/16/19 at 5:47 PM

## 2019-05-17 VITALS
HEART RATE: 50 BPM | DIASTOLIC BLOOD PRESSURE: 69 MMHG | SYSTOLIC BLOOD PRESSURE: 114 MMHG | WEIGHT: 198.41 LBS | OXYGEN SATURATION: 98 % | BODY MASS INDEX: 28.41 KG/M2 | RESPIRATION RATE: 18 BRPM | HEIGHT: 70 IN | TEMPERATURE: 97.8 F

## 2019-05-17 LAB
ANION GAP SERPL CALCULATED.3IONS-SCNC: 10 MMOL/L (ref 3–16)
BUN BLDV-MCNC: 14 MG/DL (ref 7–20)
CALCIUM SERPL-MCNC: 8.7 MG/DL (ref 8.3–10.6)
CHLORIDE BLD-SCNC: 104 MMOL/L (ref 99–110)
CO2: 27 MMOL/L (ref 21–32)
CREAT SERPL-MCNC: 0.9 MG/DL (ref 0.9–1.3)
GFR AFRICAN AMERICAN: >60
GFR NON-AFRICAN AMERICAN: >60
GLUCOSE BLD-MCNC: 97 MG/DL (ref 70–99)
HCT VFR BLD CALC: 38.4 % (ref 40.5–52.5)
HEMOGLOBIN: 12.6 G/DL (ref 13.5–17.5)
MCH RBC QN AUTO: 29.3 PG (ref 26–34)
MCHC RBC AUTO-ENTMCNC: 32.8 G/DL (ref 31–36)
MCV RBC AUTO: 89.4 FL (ref 80–100)
PDW BLD-RTO: 13.6 % (ref 12.4–15.4)
PLATELET # BLD: 223 K/UL (ref 135–450)
PMV BLD AUTO: 7.7 FL (ref 5–10.5)
POTASSIUM REFLEX MAGNESIUM: 3.8 MMOL/L (ref 3.5–5.1)
RBC # BLD: 4.29 M/UL (ref 4.2–5.9)
SODIUM BLD-SCNC: 141 MMOL/L (ref 136–145)
WBC # BLD: 7.8 K/UL (ref 4–11)

## 2019-05-17 PROCEDURE — 99233 SBSQ HOSP IP/OBS HIGH 50: CPT | Performed by: INTERNAL MEDICINE

## 2019-05-17 PROCEDURE — 80048 BASIC METABOLIC PNL TOTAL CA: CPT

## 2019-05-17 PROCEDURE — 6370000000 HC RX 637 (ALT 250 FOR IP): Performed by: INTERNAL MEDICINE

## 2019-05-17 PROCEDURE — 36415 COLL VENOUS BLD VENIPUNCTURE: CPT

## 2019-05-17 PROCEDURE — 85027 COMPLETE CBC AUTOMATED: CPT

## 2019-05-17 PROCEDURE — 2580000003 HC RX 258: Performed by: INTERNAL MEDICINE

## 2019-05-17 PROCEDURE — G0238 OTH RESP PROC, INDIV: HCPCS

## 2019-05-17 RX ORDER — LISINOPRIL 2.5 MG/1
2.5 TABLET ORAL DAILY
Status: DISCONTINUED | OUTPATIENT
Start: 2019-05-17 | End: 2019-05-17 | Stop reason: HOSPADM

## 2019-05-17 RX ORDER — LISINOPRIL 2.5 MG/1
2.5 TABLET ORAL DAILY
Qty: 30 TABLET | Refills: 3 | Status: SHIPPED | OUTPATIENT
Start: 2019-05-18 | End: 2019-09-18 | Stop reason: SDUPTHER

## 2019-05-17 RX ORDER — ASPIRIN 81 MG/1
81 TABLET, CHEWABLE ORAL DAILY
Qty: 30 TABLET | Refills: 3 | Status: SHIPPED | OUTPATIENT
Start: 2019-05-18 | End: 2019-09-18 | Stop reason: SDUPTHER

## 2019-05-17 RX ORDER — CLOPIDOGREL BISULFATE 75 MG/1
75 TABLET ORAL DAILY
Qty: 30 TABLET | Refills: 3 | Status: SHIPPED | OUTPATIENT
Start: 2019-05-18 | End: 2019-09-18 | Stop reason: SDUPTHER

## 2019-05-17 RX ORDER — ATORVASTATIN CALCIUM 40 MG/1
40 TABLET, FILM COATED ORAL NIGHTLY
Qty: 30 TABLET | Refills: 3 | Status: SHIPPED | OUTPATIENT
Start: 2019-05-17 | End: 2019-09-18 | Stop reason: SDUPTHER

## 2019-05-17 RX ADMIN — ASPIRIN 81 MG 81 MG: 81 TABLET ORAL at 08:06

## 2019-05-17 RX ADMIN — ACETAMINOPHEN 650 MG: 325 TABLET ORAL at 08:06

## 2019-05-17 RX ADMIN — LISINOPRIL 2.5 MG: 2.5 TABLET ORAL at 09:52

## 2019-05-17 RX ADMIN — CLOPIDOGREL BISULFATE 75 MG: 75 TABLET ORAL at 08:06

## 2019-05-17 RX ADMIN — Medication 10 ML: at 08:06

## 2019-05-17 ASSESSMENT — PAIN SCALES - GENERAL
PAINLEVEL_OUTOF10: 3
PAINLEVEL_OUTOF10: 0
PAINLEVEL_OUTOF10: 0

## 2019-05-17 NOTE — DISCHARGE SUMMARY
Hospital Medicine Discharge Summary    Patient ID: Kaleb Bellamy   Gender: male  : 1960   Age: 63 y.o. MRN: [id number]  Code Status: Full Code   Patient's PCP: No primary care provider on file. Admit Date: 5/15/2019     Discharge Date:  19    Admitting Physician: Nik Avendano MD     Discharge Physician: Genaro Bean MD     Discharge Diagnoses: inferior STEMI, HFrEF, cocaine abuse       Active Hospital Problems    Diagnosis Date Noted    Post PTCA [Z98.61] 2019    Dilated cardiomyopathy ([facility] [address] [I42.0] 2019    STEMI (ST elevation myocardial infarction) ([facility] [address] [I21.3] 05/15/2019       The patient was seen and examined on day of discharge and this discharge summary is in conjunction with any daily progress note from day of discharge. Hospital Course: Kaleb Bellamy is a 48 yo male with no known PMHx who presented with a few hour hx of chest pain and severe SOB. EMS called to pt's house by roommates who found him unresponsive. He was given intranasal and IV narcan for presumed opiate overdose as it was reported that he takes oxycodone for chronic pain after a vertebral fx several years ago. On arrival to ER, pt found to have ST elevations on EKG. Cath lab activated. Pt stented with BMS x1 to the RCA by Dr. Osmar Green. Admitted to the ICU on ASA and Integrilin (eptifibatide). ED labs showed renal failure, elevated lactate, and leukocytosis to 24.4 which may not all be explained by his acute STEMI. All lab abnormalities resolved with IVF. UDS + for cocaine. ECHO showed EF 20-25% with global hypokinesis. He will be sent home on ACEi, which will be titrated as outpatient. Holding beta blockers for bradycardia. Discharge on ASA and plavix. Counseled on illicit drug cessation.            Disposition:  Home    Physical Exam Performed:     /69   Pulse 55   Temp 97.8 °F (36.6 °C) (Oral)   Resp 18   Ht 5' 10\" (1.778 m)   Wt 198 lb 6.6 oz (90 kg)   SpO2 98%   BMI 28.47 kg/m² [facility] establish care with a PCP in 1-2 weeks       Activity: activity as tolerated    Diet: cardiac diet      Discharge Medications:     Current Discharge Medication List           Details   atorvastatin (LIPITOR) 20 MG tablet Take 20 mg by mouth daily      oxyCODONE-acetaminophen (PERCOCET)  MG per tablet Take 1 tablet by mouth every 4 hours as needed for Pain. Time Spent on discharge is more than 30 minutes in the examination, evaluation, counseling and review of medications and discharge plan.       Signed:    Jazmine Gallegos MD   5/17/2019

## 2019-05-17 NOTE — PLAN OF CARE
Problem: Cardiac:  Goal: Ability to maintain vital signs within normal range will improve  Description  Ability to maintain vital signs within normal range will improve  Outcome: Met This Shift   VSS with no signs of distress. Pt remains on RA with SPO2 in high 90s. BP stable. No report of pain. Will monitor    Problem: Pain:  Goal: Control of acute pain  Description  Control of acute pain  Outcome: Met This Shift   Pain assessment with each VSs and as needed. Pt is A&O and is able to use pain scale to communicate pain needs. Side effects of pain medications explained to the pt. Pt verbally expressed understanding. No side effects observed during this shift.  Will monitor

## 2019-05-17 NOTE — PLAN OF CARE
Problem: Cardiac:  Goal: Ability to maintain vital signs within normal range will improve  Description  Ability to maintain vital signs within normal range will improve  7/11/2496 [id number] by Arminda Buck RN  Outcome: Ongoing  Note:   VSS on RA. Patient bradycardic at times. Dr. Alfredito Badillo aware. Patient on plavix and ASA s/p stent placement.

## 2019-05-17 NOTE — PROGRESS NOTES
[facility] Daily Progress Note      Admit Date:  5/15/2019    Subjective:  Mr. Ammy Thomas was see and examined. F/U STEMI/CAD/PTCA/Cardiomyopathy. Feeling much better this am.  Had a good night. No chest pain. No further sob. Objective:   /80   Pulse 59   Temp 98.4 °F (36.9 °C) (Oral)   Resp 18   Ht 5' 10\" (1.778 m)   Wt 198 lb 6.6 oz (90 kg)   SpO2 100%   BMI 28.47 kg/m²       Intake/Output Summary (Last 24 hours) at 5/17/2019 0947  Last data filed at 5/17/2019 0419  Gross per 24 hour   Intake 420 ml   Output --   Net 420 ml       TELEMETRY: Sinus     Physical Exam:  General:  Awake, alert, NAD  Skin:  Warm and dry  Neck:  JVP not elevated  Chest:  Decreased BS, respiration normal  Cardiovascular:  RRR S1S2  Abdomen:  Soft nontender  Extremities:  no edema    Medications:    lisinopril  2.5 mg Oral Daily    atorvastatin  40 mg Oral Nightly    sodium chloride flush  10 mL Intravenous 2 times per day    clopidogrel  75 mg Oral Daily    aspirin  81 mg Oral Daily       sodium chloride flush, magnesium hydroxide, ondansetron, acetaminophen    Lab Data:  CBC:   Recent Labs     05/15/19  2149 05/16/19  0625 05/17/19  0420   WBC 13.6* 9.8 7.8   HGB 11.9* 10.9* 12.6*   HCT 36.5* 34.0* 38.4*   MCV 90.1 90.8 89.4    207 223     BMP:   Recent Labs     05/15/19  1042 05/16/19  0625 05/17/19  0420    136 141   K 4.7 3.9 3.8    103 104   CO2 17* 24 27   BUN 27* 24* 14   CREATININE 2.3* 1.1 0.9     LIVER PROFILE:   Recent Labs     05/15/19  1042   AST 60*   ALT 63*   BILIDIR <0.2   BILITOT 0.4   ALKPHOS 81     PT/INR: No results for input(s): PROTIME, INR in the last 72 hours. APTT: No results for input(s): APTT in the last 72 hours.   BNP:    Recent Labs     05/15/19  0701   BNP 96.0     IMAGING:     Assessment:  Patient Active Problem List    Diagnosis Date Noted    Post PTCA 05/16/2019    Dilated cardiomyopathy ([facility] [address] 05/16/2019    STEMI (ST elevation myocardial infarction) ([facility] 05/15/2019       Plan:  Feeling much better. No chest pain/sob. BP much better. Added ACE. Titrate as OP. HR low for B blocker. Again  advised him on drug use and adverse effects. Likely home today. May need soc svc help.      Core Measures:  · Discharge instructions:   · LVEF documented:   · ACEI for LV dysfunction:   · Smoking Cessation:    Lev Murrieta MD 5/17/2019 9:47 AM

## 2019-05-17 NOTE — PROGRESS NOTES
Attempted to get patient's medications filled in our pharmacy, but patient did not have the $17.00 to pay for them. Printed scripts given to patient. Patient verbalized his agreement to have medications filled at this pharmacy as soon as he leaves here. Work note and restrictions given to patient.

## 2019-05-22 ENCOUNTER — OFFICE VISIT (OUTPATIENT)
Dept: CARDIOLOGY CLINIC | Age: 59
End: 2019-05-22
Payer: COMMERCIAL

## 2019-05-22 VITALS
WEIGHT: 199.4 LBS | HEART RATE: 69 BPM | DIASTOLIC BLOOD PRESSURE: 82 MMHG | BODY MASS INDEX: 28.55 KG/M2 | OXYGEN SATURATION: 99 % | HEIGHT: 70 IN | SYSTOLIC BLOOD PRESSURE: 122 MMHG

## 2019-05-22 DIAGNOSIS — E78.2 MIXED HYPERLIPIDEMIA: ICD-10-CM

## 2019-05-22 DIAGNOSIS — I42.0 DILATED CARDIOMYOPATHY (HCC): ICD-10-CM

## 2019-05-22 DIAGNOSIS — I21.11 ST ELEVATION MYOCARDIAL INFARCTION INVOLVING RIGHT CORONARY ARTERY (HCC): ICD-10-CM

## 2019-05-22 DIAGNOSIS — Z98.61 POST PTCA: ICD-10-CM

## 2019-05-22 DIAGNOSIS — I25.10 CAD IN NATIVE ARTERY: Primary | ICD-10-CM

## 2019-05-22 PROCEDURE — 1111F DSCHRG MED/CURRENT MED MERGE: CPT | Performed by: NURSE PRACTITIONER

## 2019-05-22 PROCEDURE — 3017F COLORECTAL CA SCREEN DOC REV: CPT | Performed by: NURSE PRACTITIONER

## 2019-05-22 PROCEDURE — G8598 ASA/ANTIPLAT THER USED: HCPCS | Performed by: NURSE PRACTITIONER

## 2019-05-22 PROCEDURE — 99214 OFFICE O/P EST MOD 30 MIN: CPT | Performed by: NURSE PRACTITIONER

## 2019-05-22 PROCEDURE — 4004F PT TOBACCO SCREEN RCVD TLK: CPT | Performed by: NURSE PRACTITIONER

## 2019-05-22 PROCEDURE — G8427 DOCREV CUR MEDS BY ELIG CLIN: HCPCS | Performed by: NURSE PRACTITIONER

## 2019-05-22 PROCEDURE — G8419 CALC BMI OUT NRM PARAM NOF/U: HCPCS | Performed by: NURSE PRACTITIONER

## 2019-05-23 NOTE — PROGRESS NOTES
CC/HPI:  63 y.o. patient of Dr. Edy Klein with CAD, HLD who recently had inferior STEMI with PCI RCA. He c/o faint, dull, non-radiating, non-exertional chest discomfort. Denies sob, LH/dizziness, palpitations or syncope. No LE edema, fatigue or GI/ bleeding. Tolerating medications. Denies right groin pain. Past Medical History:   Diagnosis Date    Hyperlipidemia      Past Surgical History:   Procedure Laterality Date    CORONARY ANGIOPLASTY WITH STENT PLACEMENT      SHOULDER SURGERY       History reviewed. No pertinent family history. Social History     Tobacco Use    Smoking status: Never Smoker    Smokeless tobacco: Current User     Types: Chew   Substance Use Topics    Alcohol use: Not Currently    Drug use: Not Currently     Allergies:Patient has no known allergies. Review of Systems  General: No changes in weight, fatigue, or night sweats. HEENT: No blurry or decreased vision. No changes in hearing, nasal discharge or sore throat. Cardiovascular:  See HPI. Respiratory: No cough, hemoptysis, or wheezing. No history of asthma. Gastrointestinal:  No abdominal pain, hematochezia, melana, constipation, diarrhea, or history of GI ulcers. Genito-Urinary: No dysuria or hematuria. No urgency or polyuria. Musculoskeletal:  No complaints of joint pain, joint swelling or muscular weakness/soreness. Neurological:  No dizziness, headaches, numbness/tingling, speech problems or weakness. No history of a stroke or TIA. Psychological:  No anxiety or depression. Hematological and Lymphatic: No abnormal bleeding or bruising, blood clots, jaundice or swollen lymph nodes. Endocrine:   No malaise/lethargy, palpitations, polydipsia/polyuria, temperature intolerance or unexpected weight changes  Skin:  No rashes or non-healing ulcers.     Physical Exam:  /82 (Site: Left Upper Arm, Position: Sitting, Cuff Size: Medium Adult)   Pulse 69   Ht 5' 10\" (1.778 m)   Wt 199 lb 6.4 oz (90.4 kg)   SpO2 99% BMI 28.61 kg/m²    General (appearance):  No acute distress  Eyes: anicteric   Neck: soft, No JVD  Ears/Nose/Mouth/Thorat: No cyanosis  CV: RRR   Respiratory:  Clear  GI: soft, non-tender, non-distended  Skin: Warm, dry. No rashes  Neuro/Psych: Alert and oriented x 3. Appropriate behavior  Ext:  No c/c. No edema  Pulses:  2+ right femoral. Right groin soft, no hematoma    Weight  Discharge: Today: Weight: 199 lb 6.4 oz (90.4 kg)      CBC:   Lab Results   Component Value Date    WBC 7.8 2019    HGB 12.6 (L) 2019    HCT 38.4 (L) 2019    MCV 89.4 2019     2019     BMP:  Lab Results   Component Value Date    CREATININE 0.9 2019    BUN 14 2019     2019    K 3.8 2019     2019    CO2 27 2019     Mag:   Lab Results   Component Value Date    MG 2.10 2019     LIVER PROFILE:   Lab Results   Component Value Date    ALT 63 (H) 05/15/2019    AST 60 (H) 05/15/2019    ALKPHOS 81 05/15/2019    BILITOT 0.4 05/15/2019     PT/INR: No results found for: INR, PROTIME  Pro-BNP No results found for: PROBNP  LIPIDS:  No components found for: CHLPL  Lab Results   Component Value Date    TRIG 40 05/15/2019     Lab Results   Component Value Date    HDL 36 (L) 05/15/2019     Lab Results   Component Value Date    LDLCALC 102 (H) 05/15/2019     Lab Results   Component Value Date    LABVLDL 8 05/15/2019     TSH:No results found for: TSH, I3LULVG, I1GTZIH, THYROIDAB    IMAGIN/15/2019 Echo:  Left ventricular cavity size is normal. There is mild concentric left   ventricular hypertrophy. Left ventricular function is severely reduced with   ejection fraction estimated at 20-25%. Severe global left ventricular   hypokinesis is present. Diastolic filling parameters suggest grade I   diastolic dysfunction. Normal LVEDP.   Right ventricular size is moderately enlarged.  RV systolic function is   normal.    5/15/2019 ECG: inferior STEMI    Medications: Current Outpatient Medications   Medication Sig Dispense Refill    aspirin 81 MG chewable tablet Take 1 tablet by mouth daily 30 tablet 3    atorvastatin (LIPITOR) 40 MG tablet Take 1 tablet by mouth nightly 30 tablet 3    lisinopril (PRINIVIL;ZESTRIL) 2.5 MG tablet Take 1 tablet by mouth daily 30 tablet 3    clopidogrel (PLAVIX) 75 MG tablet Take 1 tablet by mouth daily 30 tablet 3    oxyCODONE-acetaminophen (PERCOCET)  MG per tablet Take 1 tablet by mouth every 4 hours as needed for Pain. No current facility-administered medications for this visit. Assessment:  1. CAD in native artery    2. Post PTCA    3. Dilated cardiomyopathy ([facility] [address]    4. ST elevation myocardial infarction involving right coronary artery ([facility] [address]    5. Mixed hyperlipidemia          Plan:  Cont asa, lipitor, lisinopril and plavix  Follow up with Dr. Adela Frausto  No BB d/t soft BP and HR. Will add on if can tolerate later on. Discussed against drug use.

## 2019-06-17 ENCOUNTER — OFFICE VISIT (OUTPATIENT)
Dept: CARDIOLOGY CLINIC | Age: 59
End: 2019-06-17
Payer: COMMERCIAL

## 2019-06-17 VITALS
WEIGHT: 197 LBS | SYSTOLIC BLOOD PRESSURE: 130 MMHG | BODY MASS INDEX: 28.27 KG/M2 | DIASTOLIC BLOOD PRESSURE: 80 MMHG | HEART RATE: 60 BPM

## 2019-06-17 DIAGNOSIS — I25.10 CAD IN NATIVE ARTERY: ICD-10-CM

## 2019-06-17 DIAGNOSIS — Z98.61 HISTORY OF PTCA: ICD-10-CM

## 2019-06-17 DIAGNOSIS — I42.0 DILATED CARDIOMYOPATHY (HCC): ICD-10-CM

## 2019-06-17 DIAGNOSIS — I21.11 ST ELEVATION (STEMI) MYOCARDIAL INFARCTION INVOLVING RIGHT CORONARY ARTERY (HCC): Primary | ICD-10-CM

## 2019-06-17 PROCEDURE — 99214 OFFICE O/P EST MOD 30 MIN: CPT | Performed by: INTERNAL MEDICINE

## 2019-06-17 PROCEDURE — G8428 CUR MEDS NOT DOCUMENT: HCPCS | Performed by: INTERNAL MEDICINE

## 2019-06-17 PROCEDURE — 3017F COLORECTAL CA SCREEN DOC REV: CPT | Performed by: INTERNAL MEDICINE

## 2019-06-17 PROCEDURE — G8598 ASA/ANTIPLAT THER USED: HCPCS | Performed by: INTERNAL MEDICINE

## 2019-06-17 PROCEDURE — G8419 CALC BMI OUT NRM PARAM NOF/U: HCPCS | Performed by: INTERNAL MEDICINE

## 2019-06-17 PROCEDURE — 4004F PT TOBACCO SCREEN RCVD TLK: CPT | Performed by: INTERNAL MEDICINE

## 2019-06-17 ASSESSMENT — ENCOUNTER SYMPTOMS
SHORTNESS OF BREATH: 0
APNEA: 0
CHOKING: 0
CHEST TIGHTNESS: 0
ABDOMINAL DISTENTION: 0
COUGH: 0

## 2019-06-17 NOTE — PROGRESS NOTES
Subjective:      Patient ID: Tim Fine is a 63 y.o. male. HPI  F/U STEMI/cardiomyopathy/CAD/PTCA. No complaints. Feels pretty well. Back to work. No chest pain/sob. Past Medical History:   Diagnosis Date    Hyperlipidemia     STEMI (ST elevation myocardial infarction) ([facility] [address] 05/2019    inferior STEMI; subtotal RCA stented with 4 x 23 mm BMS.  EF 20-25%     Past Surgical History:   Procedure Laterality Date    CORONARY ANGIOPLASTY WITH STENT PLACEMENT      SHOULDER SURGERY       Social History     Socioeconomic History    Marital status: Single     Spouse name: Not on file    Number of children: Not on file    Years of education: Not on file    Highest education level: Not on file   Occupational History    Not on file   Social Needs    Financial resource strain: Not on file    Food insecurity:     Worry: Not on file     Inability: Not on file    Transportation needs:     Medical: Not on file     Non-medical: Not on file   Tobacco Use    Smoking status: Never Smoker    Smokeless tobacco: Current User     Types: Chew   Substance and Sexual Activity    Alcohol use: Not Currently    Drug use: Not Currently    Sexual activity: Not on file   Lifestyle    Physical activity:     Days per week: Not on file     Minutes per session: Not on file    Stress: Not on file   Relationships    Social connections:     Talks on phone: Not on file     Gets together: Not on file     Attends Yarsanism service: Not on file     Active member of club or organization: Not on file     Attends meetings of clubs or organizations: Not on file     Relationship status: Not on file    Intimate partner violence:     Fear of current or ex partner: Not on file     Emotionally abused: Not on file     Physically abused: Not on file     Forced sexual activity: Not on file   Other Topics Concern    Not on file   Social History Narrative    Not on file     FH reviewed, Denies FH cardiac issues    Vitals:    06/17/19 1501 BP: 130/80   Pulse: 60     Wt 197    Review of Systems   Constitutional: Negative for activity change and fatigue. Respiratory: Negative for apnea, cough, choking, chest tightness and shortness of breath. Cardiovascular: Negative for chest pain, palpitations and leg swelling. No PND or orthopnea. No tachycardia. Gastrointestinal: Negative for abdominal distention. Musculoskeletal: Negative for myalgias. Neurological: Negative for dizziness, syncope and light-headedness. Psychiatric/Behavioral: Negative for agitation, behavioral problems and confusion. All other systems reviewed and are negative. Objective:   Physical Exam   Constitutional: He is oriented to person, place, and time. He appears well-developed and well-nourished. No distress. HENT:   Head: Normocephalic and atraumatic. Eyes: EOM are normal. Right eye exhibits no discharge. Left eye exhibits no discharge. Neck: Normal range of motion. Neck supple. No JVD present. Cardiovascular: Normal rate, regular rhythm and normal heart sounds. Exam reveals no gallop. Pulmonary/Chest: Effort normal and breath sounds normal. No stridor. No respiratory distress. He has no wheezes. He has no rales. Abdominal: Soft. Bowel sounds are normal. There is no tenderness. Musculoskeletal: Normal range of motion. He exhibits no edema. Neurological: He is alert and oriented to person, place, and time. Skin: Skin is warm and dry. Psychiatric: He has a normal mood and affect. His behavior is normal. Thought content normal.       Assessment:       Diagnosis Orders   1. ST elevation (STEMI) myocardial infarction involving right coronary artery ([facility] [address]     2. CAD in native artery     3. History of PTCA     4. [facility]ilated [facility]             Plan:      CV stable. No angina. Appears compensated. Continue current regimen. No B blocker since sandy with med. increase activities.  Reviewed previous records and testing including hosp and cath 6/19. No changes. Continue to monitor. 4-6 wks. prob echo from next visit to recheck EF.          Freya Young MD

## 2019-07-22 ENCOUNTER — OFFICE VISIT (OUTPATIENT)
Dept: CARDIOLOGY CLINIC | Age: 59
End: 2019-07-22
Payer: COMMERCIAL

## 2019-07-22 VITALS
BODY MASS INDEX: 28.41 KG/M2 | DIASTOLIC BLOOD PRESSURE: 80 MMHG | WEIGHT: 198 LBS | HEART RATE: 60 BPM | SYSTOLIC BLOOD PRESSURE: 130 MMHG

## 2019-07-22 DIAGNOSIS — Z98.61 HISTORY OF PTCA: ICD-10-CM

## 2019-07-22 DIAGNOSIS — I21.11 ST ELEVATION (STEMI) MYOCARDIAL INFARCTION INVOLVING RIGHT CORONARY ARTERY (HCC): Primary | ICD-10-CM

## 2019-07-22 DIAGNOSIS — I42.0 DILATED CARDIOMYOPATHY (HCC): ICD-10-CM

## 2019-07-22 DIAGNOSIS — I25.10 CAD IN NATIVE ARTERY: ICD-10-CM

## 2019-07-22 PROCEDURE — 4004F PT TOBACCO SCREEN RCVD TLK: CPT | Performed by: INTERNAL MEDICINE

## 2019-07-22 PROCEDURE — G8598 ASA/ANTIPLAT THER USED: HCPCS | Performed by: INTERNAL MEDICINE

## 2019-07-22 PROCEDURE — G8427 DOCREV CUR MEDS BY ELIG CLIN: HCPCS | Performed by: INTERNAL MEDICINE

## 2019-07-22 PROCEDURE — 3017F COLORECTAL CA SCREEN DOC REV: CPT | Performed by: INTERNAL MEDICINE

## 2019-07-22 PROCEDURE — 99214 OFFICE O/P EST MOD 30 MIN: CPT | Performed by: INTERNAL MEDICINE

## 2019-07-22 PROCEDURE — G8419 CALC BMI OUT NRM PARAM NOF/U: HCPCS | Performed by: INTERNAL MEDICINE

## 2019-07-22 ASSESSMENT — ENCOUNTER SYMPTOMS
APNEA: 0
CHEST TIGHTNESS: 0
SHORTNESS OF BREATH: 0
CHOKING: 0
COUGH: 0
ABDOMINAL DISTENTION: 0

## 2019-07-23 DIAGNOSIS — I42.0 DILATED CARDIOMYOPATHY (HCC): ICD-10-CM

## 2019-07-23 DIAGNOSIS — I21.11 ST ELEVATION (STEMI) MYOCARDIAL INFARCTION INVOLVING RIGHT CORONARY ARTERY (HCC): ICD-10-CM

## 2019-07-23 DIAGNOSIS — Z98.61 HISTORY OF PTCA: ICD-10-CM

## 2019-07-23 DIAGNOSIS — I25.10 CAD IN NATIVE ARTERY: ICD-10-CM

## 2019-07-23 LAB
ALBUMIN SERPL-MCNC: 4.5 G/DL (ref 3.4–5)
ALP BLD-CCNC: 82 U/L (ref 40–129)
ALT SERPL-CCNC: 18 U/L (ref 10–40)
AST SERPL-CCNC: 17 U/L (ref 15–37)
BILIRUB SERPL-MCNC: 0.3 MG/DL (ref 0–1)
BILIRUBIN DIRECT: <0.2 MG/DL (ref 0–0.3)
BILIRUBIN, INDIRECT: NORMAL MG/DL (ref 0–1)
CHOLESTEROL, TOTAL: 122 MG/DL (ref 0–199)
HDLC SERPL-MCNC: 29 MG/DL (ref 40–60)
LDL CHOLESTEROL CALCULATED: 67 MG/DL
TOTAL PROTEIN: 6.9 G/DL (ref 6.4–8.2)
TRIGL SERPL-MCNC: 131 MG/DL (ref 0–150)
VLDLC SERPL CALC-MCNC: 26 MG/DL

## 2019-07-29 ENCOUNTER — HOSPITAL ENCOUNTER (OUTPATIENT)
Dept: NON INVASIVE DIAGNOSTICS | Age: 59
Discharge: HOME | End: 2019-07-29
Payer: COMMERCIAL

## 2019-07-29 DIAGNOSIS — I42.0 DILATED CARDIOMYOPATHY (HCC): ICD-10-CM

## 2019-07-29 DIAGNOSIS — Z98.61 HISTORY OF PTCA: ICD-10-CM

## 2019-07-29 DIAGNOSIS — I25.10 CAD IN NATIVE ARTERY: ICD-10-CM

## 2019-07-29 DIAGNOSIS — I21.11 ST ELEVATION (STEMI) MYOCARDIAL INFARCTION INVOLVING RIGHT CORONARY ARTERY (HCC): ICD-10-CM

## 2019-07-29 LAB
LV EF: 55 %
LVEF MODALITY: NORMAL

## 2019-07-29 PROCEDURE — 93306 TTE W/DOPPLER COMPLETE: CPT

## 2019-09-18 RX ORDER — CLOPIDOGREL BISULFATE 75 MG/1
75 TABLET ORAL DAILY
Qty: 30 TABLET | Refills: 3 | Status: SHIPPED | OUTPATIENT
Start: 2019-09-18 | End: 2021-11-12 | Stop reason: ALTCHOICE

## 2019-09-18 RX ORDER — ATORVASTATIN CALCIUM 40 MG/1
40 TABLET, FILM COATED ORAL NIGHTLY
Qty: 30 TABLET | Refills: 3 | Status: SHIPPED | OUTPATIENT
Start: 2019-09-18 | End: 2021-11-12 | Stop reason: ALTCHOICE

## 2019-09-18 RX ORDER — ASPIRIN 81 MG/1
81 TABLET, CHEWABLE ORAL DAILY
Qty: 30 TABLET | Refills: 3 | Status: SHIPPED | OUTPATIENT
Start: 2019-09-18 | End: 2021-11-12 | Stop reason: ALTCHOICE

## 2019-09-18 RX ORDER — LISINOPRIL 2.5 MG/1
2.5 TABLET ORAL DAILY
Qty: 30 TABLET | Refills: 3 | Status: SHIPPED | OUTPATIENT
Start: 2019-09-18 | End: 2021-11-12 | Stop reason: ALTCHOICE

## 2019-10-28 ENCOUNTER — OFFICE VISIT (OUTPATIENT)
Dept: CARDIOLOGY CLINIC | Age: 59
End: 2019-10-28
Payer: COMMERCIAL

## 2019-10-28 VITALS
BODY MASS INDEX: 27.98 KG/M2 | DIASTOLIC BLOOD PRESSURE: 80 MMHG | WEIGHT: 195 LBS | SYSTOLIC BLOOD PRESSURE: 118 MMHG | HEART RATE: 72 BPM

## 2019-10-28 DIAGNOSIS — Z98.61 HISTORY OF PTCA: ICD-10-CM

## 2019-10-28 DIAGNOSIS — I25.5 ISCHEMIC CARDIOMYOPATHY: ICD-10-CM

## 2019-10-28 DIAGNOSIS — I25.10 CAD IN NATIVE ARTERY: Primary | ICD-10-CM

## 2019-10-28 DIAGNOSIS — I25.2 MI, OLD: ICD-10-CM

## 2019-10-28 PROCEDURE — 3017F COLORECTAL CA SCREEN DOC REV: CPT | Performed by: INTERNAL MEDICINE

## 2019-10-28 PROCEDURE — G8484 FLU IMMUNIZE NO ADMIN: HCPCS | Performed by: INTERNAL MEDICINE

## 2019-10-28 PROCEDURE — 4004F PT TOBACCO SCREEN RCVD TLK: CPT | Performed by: INTERNAL MEDICINE

## 2019-10-28 PROCEDURE — G8419 CALC BMI OUT NRM PARAM NOF/U: HCPCS | Performed by: INTERNAL MEDICINE

## 2019-10-28 PROCEDURE — G8598 ASA/ANTIPLAT THER USED: HCPCS | Performed by: INTERNAL MEDICINE

## 2019-10-28 PROCEDURE — 99214 OFFICE O/P EST MOD 30 MIN: CPT | Performed by: INTERNAL MEDICINE

## 2019-10-28 PROCEDURE — G8427 DOCREV CUR MEDS BY ELIG CLIN: HCPCS | Performed by: INTERNAL MEDICINE

## 2019-10-28 ASSESSMENT — ENCOUNTER SYMPTOMS
COUGH: 0
ABDOMINAL DISTENTION: 0
APNEA: 0
CHOKING: 0
CHEST TIGHTNESS: 0
SHORTNESS OF BREATH: 0

## 2020-07-11 ENCOUNTER — HOSPITAL ENCOUNTER (EMERGENCY)
Age: 60
Discharge: HOME | End: 2020-07-11
Attending: EMERGENCY MEDICINE

## 2020-07-11 VITALS
HEART RATE: 66 BPM | DIASTOLIC BLOOD PRESSURE: 72 MMHG | OXYGEN SATURATION: 96 % | TEMPERATURE: 98.5 F | RESPIRATION RATE: 18 BRPM | SYSTOLIC BLOOD PRESSURE: 103 MMHG

## 2020-07-11 PROCEDURE — 99284 EMERGENCY DEPT VISIT MOD MDM: CPT

## 2020-07-11 ASSESSMENT — ENCOUNTER SYMPTOMS
SHORTNESS OF BREATH: 0
BACK PAIN: 0
DIARRHEA: 0
COUGH: 0
NAUSEA: 0
ABDOMINAL PAIN: 0
VOMITING: 0

## 2020-07-11 NOTE — ED PROVIDER NOTES
ED Attending Attestation Note     Date of evaluation: 7/11/2020    This patient was seen by the advance practice provider. I have seen and examined the patient, agree with the workup, evaluation, management and diagnosis. The care plan has been discussed. My assessment reveals patient here post OD on fentanyl. 4mg narcan prehospital as he was unresponsive, now awake. Observed for 2h with no sequelae and stable for discharge.      Sissy Severs, MD  07/11/20 9908

## 2020-07-11 NOTE — ED NOTES
Bed: A08-08  Expected date:   Expected time:   Means of arrival:   Comments:  Nidhi Mcdowell 33, RN  07/11/20 6118

## 2020-07-11 NOTE — ED NOTES
Patient prepared for and ready to be discharged. Patient discharged at this time in no acute distress after verbalizing understanding of discharge instructions. Patient left after receiving After Visit Summary instructions.       John oCmbs RN  07/11/20 7522

## 2020-07-11 NOTE — ED PROVIDER NOTES
1 [facility]  EMERGENCY DEPARTMENT ENCOUNTER          PHYSICIAN ASSISTANT NOTE       Date of evaluation: 7/11/2020    Chief Complaint     Drug Overdose      History of Present Illness     Andressa Luther is a 62 y.o. male who presents presents the emergency department with fentanyl overdose. Patient states this morning he snorted fentanyl for the first time. EMS gave the patient 4 mg of intranasal Narcan at approximately 12:30 this afternoon. Patient is alert and oriented x4. His only complaint is feeling fatigued at this time. Specifically denies chest pain, shortness of breath, abdominal pain, nausea, vomiting, or fever. Review of Systems     Review of Systems   Constitutional: Positive for fatigue. Negative for activity change, chills and fever. HENT: Negative for congestion. Eyes: Negative for visual disturbance. Respiratory: Negative for cough and shortness of breath. Cardiovascular: Negative for chest pain. Gastrointestinal: Negative for abdominal pain, diarrhea, nausea and vomiting. Genitourinary: Negative for difficulty urinating. Musculoskeletal: Negative for back pain and neck pain. Neurological: Negative for dizziness, weakness and headaches. Psychiatric/Behavioral: Negative for agitation. Past Medical, Surgical, Family, and Social History     He has a past medical history of Hyperlipidemia and STEMI (ST elevation myocardial infarction) ([facility] [address] He has a past surgical history that includes Coronary angioplasty with stent and shoulder surgery. His family history is not on file. He reports that he has never smoked. His smokeless tobacco use includes chew. He reports previous alcohol use. He reports current drug use. Drug: Other-see comments.     Medications     Discharge Medication List as of 7/11/2020  2:52 PM      CONTINUE these medications which have NOT CHANGED    Details   atorvastatin (LIPITOR) 40 MG tablet Take 1 tablet by mouth nightly, Disp-30 tablet, R-3Normal      lisinopril (PRINIVIL;ZESTRIL) 2.5 MG tablet Take 1 tablet by mouth daily, Disp-30 tablet, R-3Normal      clopidogrel (PLAVIX) 75 MG tablet Take 1 tablet by mouth daily, Disp-30 tablet, R-3Normal      aspirin 81 MG chewable tablet Take 1 tablet by mouth daily, Disp-30 tablet, R-3Normal      oxyCODONE-acetaminophen (PERCOCET)  MG per tablet Take 1 tablet by mouth every 4 hours as needed for Pain. Historical Med             Allergies     He has No Known Allergies. Physical Exam     INITIAL VITALS: BP: 115/72, Temp: 98.5 °F (36.9 °C), Pulse: 68, Resp: 18, SpO2: 93 %  Physical Exam  Constitutional:       Appearance: Normal appearance. HENT:      Head: Normocephalic and atraumatic. Eyes:      Extraocular Movements: Extraocular movements intact. Pupils: Pupils are equal, round, and reactive to light. Cardiovascular:      Rate and Rhythm: Normal rate and regular rhythm. Pulmonary:      Effort: Pulmonary effort is normal. No respiratory distress. Breath sounds: Normal breath sounds. Abdominal:      General: There is no distension. Palpations: Abdomen is soft. Tenderness: There is no abdominal tenderness. Musculoskeletal: Normal range of motion. Skin:     General: Skin is warm and dry. Neurological:      General: No focal deficit present. Mental Status: He is alert and oriented to person, place, and time. Psychiatric:         Mood and Affect: Mood normal.         Behavior: Behavior normal.         Diagnostic Results     RADIOLOGY:  No orders to display       LABS:   No results found for this visit on 07/11/20. RECENT VITALS:  BP: 103/72, Temp: 98.5 °F (36.9 °C), Pulse: 66, Resp: 18, SpO2: 96 %     ED Course     Nursing Notes, Past Medical Hx,Past Surgical Hx, Social Hx, Allergies, and Family Hx were reviewed. The patient was given the following medications:  No orders of the defined types were placed in this encounter.       CONSULTS:  None    MEDICAL DECISION MAKING / ASSESSMENT / Marina Johnson is a 62 y.o. male who presents to the emergency department after overdose. Vital signs were stable on presentation and remained stable throughout his stay. Thorough history and physical exam was performed as detailed above. Patient presents emergency department due to fentanyl overdose. He states he snorted fentanyl ordered earlier today. When EMS arrived he was unconscious, however had a pulse. He was given 4 mg of Narcan intranasally and woke up. He has been alert and oriented x4 ever since. On presentation to the emergency department he states he feels slightly fatigued, however denies any other symptoms. Oxygen saturations are above 95% when awake. When the patient falls asleep they do drop slightly, however anytime he wakes up they respond appropriately. On physical examination heart rate and rhythm are regular. Lungs clear to auscultation bilaterally. Abdomen soft and nontender. Patient was observed in the emergency department for 2 hours after Narcan was given and showed no signs of worsening respiratory depression. At this time I believe he is stable to be discharged with instructions to follow-up with his primary care provider as needed. Plan was thoroughly discussed with the patient and he is agreeable. He was given strict return precautions and discharged home. This patient was also evaluated by the attending physician. All care plans were discussed and agreed upon. Clinical Impression     1.  Accidental drug overdose, initial encounter        Disposition     PATIENT REFERRED TO:  [address] [address], [facility]  [address] [address]  [phone]    Schedule an appointment as soon as possible for a visit   As needed    [facility] Emergency Department  [address] [place] [address]  Go to   If symptoms worsen      DISCHARGE MEDICATIONS:  Discharge Medication List as of 7/11/2020  2:52 PM          DISPOSITION  discharge        Balwinder Lisa PA-C  07/11/20 1548

## 2020-09-07 ENCOUNTER — APPOINTMENT (OUTPATIENT)
Dept: GENERAL RADIOLOGY | Age: 60
End: 2020-09-07
Payer: COMMERCIAL

## 2020-09-07 ENCOUNTER — HOSPITAL ENCOUNTER (EMERGENCY)
Age: 60
Discharge: HOME | End: 2020-09-07
Attending: EMERGENCY MEDICINE
Payer: COMMERCIAL

## 2020-09-07 VITALS
RESPIRATION RATE: 16 BRPM | OXYGEN SATURATION: 97 % | TEMPERATURE: 98.3 F | SYSTOLIC BLOOD PRESSURE: 149 MMHG | HEART RATE: 74 BPM | DIASTOLIC BLOOD PRESSURE: 94 MMHG

## 2020-09-07 PROCEDURE — 73030 X-RAY EXAM OF SHOULDER: CPT

## 2020-09-07 PROCEDURE — 99283 EMERGENCY DEPT VISIT LOW MDM: CPT

## 2020-09-07 PROCEDURE — 6360000002 HC RX W HCPCS: Performed by: STUDENT IN AN ORGANIZED HEALTH CARE EDUCATION/TRAINING PROGRAM

## 2020-09-07 PROCEDURE — 96372 THER/PROPH/DIAG INJ SC/IM: CPT

## 2020-09-07 PROCEDURE — 6370000000 HC RX 637 (ALT 250 FOR IP): Performed by: STUDENT IN AN ORGANIZED HEALTH CARE EDUCATION/TRAINING PROGRAM

## 2020-09-07 RX ORDER — ACETAMINOPHEN 500 MG
1000 TABLET ORAL ONCE
Status: COMPLETED | OUTPATIENT
Start: 2020-09-07 | End: 2020-09-07

## 2020-09-07 RX ORDER — KETOROLAC TROMETHAMINE 30 MG/ML
15 INJECTION, SOLUTION INTRAMUSCULAR; INTRAVENOUS ONCE
Status: COMPLETED | OUTPATIENT
Start: 2020-09-07 | End: 2020-09-07

## 2020-09-07 RX ADMIN — ACETAMINOPHEN 1000 MG: 500 TABLET ORAL at 16:07

## 2020-09-07 RX ADMIN — KETOROLAC TROMETHAMINE 15 MG: 30 INJECTION, SOLUTION INTRAMUSCULAR at 16:07

## 2020-09-07 ASSESSMENT — PAIN DESCRIPTION - DESCRIPTORS: DESCRIPTORS: ACHING;SORE

## 2020-09-07 ASSESSMENT — PAIN DESCRIPTION - PAIN TYPE: TYPE: ACUTE PAIN

## 2020-09-07 ASSESSMENT — PAIN - FUNCTIONAL ASSESSMENT: PAIN_FUNCTIONAL_ASSESSMENT: PREVENTS OR INTERFERES SOME ACTIVE ACTIVITIES AND ADLS

## 2020-09-07 ASSESSMENT — PAIN DESCRIPTION - PROGRESSION: CLINICAL_PROGRESSION: NOT CHANGED

## 2020-09-07 ASSESSMENT — PAIN SCALES - GENERAL
PAINLEVEL_OUTOF10: 10
PAINLEVEL_OUTOF10: 9

## 2020-09-07 ASSESSMENT — PAIN DESCRIPTION - FREQUENCY: FREQUENCY: CONTINUOUS

## 2020-09-07 ASSESSMENT — PAIN DESCRIPTION - ONSET: ONSET: SUDDEN

## 2020-09-07 ASSESSMENT — PAIN DESCRIPTION - ORIENTATION: ORIENTATION: LEFT

## 2020-09-07 ASSESSMENT — PAIN DESCRIPTION - LOCATION: LOCATION: SHOULDER

## 2020-09-07 NOTE — ED TRIAGE NOTES
Pt came in for complaints of L shoulder pain, pt states he was lifting and tossing something at work and he heard a pop and it has hurt ever since that moment on Friday.

## 2020-09-07 NOTE — ED PROVIDER NOTES
[address] [facility] RESIDENT NOTE       Date of evaluation: 9/7/2020    Chief Complaint     Shoulder Pain (at work)      of Present Illness     Sujatha Nick is a 62 y.o. male with the PMHx listed below,  presenting with left shoulder pain after lifting a box on Friday. He felt a pop in his left arm. Noted a deformity to his bicep. He has pain with range of motion specifically flexion. Pain is moderate sharp exacerbated with movement or lifting. No other symptoms or injuries    With the exception of above, the patient denies any aggravating or alleviating factors as well as any other associated signs or symptoms. Review of Systems     ROS: Pertinent positive and negative findings as documented in the HPI otherwise all other systems were reviewed and were negative    Past Medical, Surgical, Family, and Social History     He has a past medical history of Hyperlipidemia and STEMI (ST elevation myocardial infarction) ([facility] [address] He has a past surgical history that includes Coronary angioplasty with stent and shoulder surgery. His family history is not on file. He reports that he has never smoked. His smokeless tobacco use includes chew. He reports previous alcohol use. He reports current drug use. Drug: Other-see comments. Medications     Previous Medications    ASPIRIN 81 MG CHEWABLE TABLET    Take 1 tablet by mouth daily    ATORVASTATIN (LIPITOR) 40 MG TABLET    Take 1 tablet by mouth nightly    CLOPIDOGREL (PLAVIX) 75 MG TABLET    Take 1 tablet by mouth daily    LISINOPRIL (PRINIVIL;ZESTRIL) 2.5 MG TABLET    Take 1 tablet by mouth daily    OXYCODONE-ACETAMINOPHEN (PERCOCET)  MG PER TABLET    Take 1 tablet by mouth every 4 hours as needed for Pain. Allergies     He has No Known Allergies. Physical Exam     INITIAL VITALS: BP: (!) 149/94, Temp: 98.3 °F (36.8 °C), Pulse: 74, Resp: 16, SpO2: 97 %     General:  Well appearing and resting comfortably. No acute distress. Eyes:  PERRLA. EOMI. No discharge from eyes. ENT:  No discharge from nose. OP clear. No tonsillar exudates. Neck:  Supple without lymphadenopathy. Pulmonary:   Non-labored breathing. Lungs CTAB. Cardiac:  Regular rate and rhythm. No murmurs, rubs, or gallops. Abdomen:  Soft. Non-tender. Non-distended. No rigidity/rebound/guarding  Musculoskeletal:  No long bone deformity. No CVA tenderness. There is tenderness palpation over the anterior left shoulder with a palpable proximal biceps tendon with a bulging deformity of the left biceps. Vascular:  Extremities warm and perfused. Capillary refill <2seconds. Skin:  No rash. Neuro: Alert and oriented x 4. CN II-XII intact. 5/5 strength in all 4 distal extremities. Sensation grossly intact to light touch. Speech and mentation normal.    Extremities:  No peripheral edema. DiagnosticResults       RADIOLOGY:  XR SHOULDER LEFT (MIN 2 VIEWS)   Final Result      No acute fracture seen. LABS:   No results found for this visit on 09/07/20. RECENT VITALS:  BP: (!) 149/94, Temp: 98.3 °F (36.8 °C), Pulse: 74,Resp: 16, SpO2: 97 %     Procedures         ED Course     Nursing Notes, Past Medical Hx, Past Surgical Hx, Social Hx, Allergies, and Family Hx were reviewed. The patient was given the followingmedications:  Orders Placed This Encounter   Medications    ketorolac (TORADOL) injection 15 mg    acetaminophen (TYLENOL) tablet 1,000 mg       CONSULTS:  None    MEDICAL DECISION MAKING / ASSESSMENT / Chaparrita Colón is a 62 y.o. male who presented to the emergency department with Shoulder Pain (at work)   with a history and presentation as described above in HPI. The patient was evaluated by myself and the ED Attending Physician listed above. All management and disposition plans were discussed and agreed upon. Furthermore, a thorough chart review was performed during this encounter.       Vital signs on arrival: Unremarkable    X-ray left shoulder clear. Based on exam it is likely a proximal biceps tendon rupture. Given Toradol and Tylenol here. Advised to only perform activity as tolerated. Was told to take ibuprofen and Tylenol as needed for the pain for the next week and establish care with a primary care physician. He was given orthopedic surgery information to call if the symptoms prohibit him from his routine activity in 1 month    The patient was reassessed prior to final disposition. Summary of therapeutics provided in the emergency department:  Medications   ketorolac (TORADOL) injection 15 mg (has no administration in time range)   acetaminophen (TYLENOL) tablet 1,000 mg (has no administration in time range)       Clinical Impression     1.  Rupture of left proximal biceps tendon, initial encounter        Disposition     PATIENT REFERRED TO:  Olesya Felder MD  [facility] [address]  [phone]      in 4 week if symptoms don't mprove      DISCHARGE MEDICATIONS:  New Prescriptions    No medications on file       DISPOSITION Decision To Discharge 09/07/2020 03:55:44 PM       Morgan Khanna MD  Resident  09/07/20 5064

## 2020-09-07 NOTE — ED PROVIDER NOTES
ED Attending Attestation Note     Date of evaluation: 9/7/2020    This patient was seen by the resident. I have seen and examined the patient, agree with the workup, evaluation, management and diagnosis. The care plan has been discussed. My assessment reveals a 64-year-old gentleman presenting to the emergency department with complaint of left upper arm pain. On examination patient has a deformity involving the left biceps muscle with associated weakness.      Aditya Contreras MD  09/07/20 1539

## 2020-09-07 NOTE — ED NOTES
Pt discharged from ED in stable, ambulatory condition. Discharge instructions explained, all questions answered. Pt walked to lobby independently.        Dena Hector RN  09/07/20 0745

## 2020-10-04 ENCOUNTER — HOSPITAL ENCOUNTER (EMERGENCY)
Age: 60
Discharge: HOME | End: 2020-10-04
Attending: EMERGENCY MEDICINE

## 2020-10-04 VITALS
HEART RATE: 84 BPM | OXYGEN SATURATION: 95 % | TEMPERATURE: 99.6 F | SYSTOLIC BLOOD PRESSURE: 112 MMHG | DIASTOLIC BLOOD PRESSURE: 71 MMHG | RESPIRATION RATE: 16 BRPM

## 2020-10-04 PROCEDURE — 99283 EMERGENCY DEPT VISIT LOW MDM: CPT

## 2020-10-04 RX ORDER — NALOXONE HYDROCHLORIDE 4 MG/.1ML
1 SPRAY NASAL PRN
Status: DISCONTINUED | OUTPATIENT
Start: 2020-10-04 | End: 2020-10-04 | Stop reason: HOSPADM

## 2020-10-04 RX ADMIN — NALOXONE HYDROCHLORIDE 1 SPRAY: 4 SPRAY NASAL at 04:21

## 2020-10-04 ASSESSMENT — ENCOUNTER SYMPTOMS
EYES NEGATIVE: 1
RESPIRATORY NEGATIVE: 1
GASTROINTESTINAL NEGATIVE: 1

## 2020-10-04 NOTE — ED PROVIDER NOTES
MG chewable tablet Take 1 tablet by mouth daily  Qty: 30 tablet, Refills: 3      oxyCODONE-acetaminophen (PERCOCET)  MG per tablet Take 1 tablet by mouth every 4 hours as needed for Pain. Allergies     He has No Known Allergies. Physical Exam     INITIAL VITALS: BP: 124/77, Temp: 99.6 °F (37.6 °C), Pulse: 79, Resp: 28, SpO2: 97 %   Physical Exam  Vitals signs and nursing note reviewed. Constitutional:       General: He is not in acute distress. HENT:      Head: Normocephalic and atraumatic. Mouth/Throat:      Mouth: Mucous membranes are moist.      Pharynx: No oropharyngeal exudate. Eyes:      General: No scleral icterus. Extraocular Movements: Extraocular movements intact. Conjunctiva/sclera: Conjunctivae normal.      Pupils: Pupils are equal, round, and reactive to light. Neck:      Musculoskeletal: Normal range of motion and neck supple. Cardiovascular:      Rate and Rhythm: Normal rate and regular rhythm. Heart sounds: Normal heart sounds. Pulmonary:      Effort: Pulmonary effort is normal.      Breath sounds: Normal breath sounds. No wheezing, rhonchi or rales. Abdominal:      General: Bowel sounds are normal.      Palpations: Abdomen is soft. Tenderness: There is no abdominal tenderness. There is no guarding or rebound. Musculoskeletal: Normal range of motion. General: No swelling. Neurological:      General: No focal deficit present. Mental Status: He is alert and oriented to person, place, and time. Cranial Nerves: No cranial nerve deficit. Motor: No weakness. Coordination: Coordination normal.         Diagnostic Results     RECENT VITALS:  BP: 109/75,Temp: 99.6 °F (37.6 °C), Pulse: 86, Resp: 15, SpO2: (!) 89 %     Procedures     N/A    ED Course     Nursing Notes, Past Medical Hx, Past Surgical Hx, Social Hx,Allergies, and Family Hx were reviewed.     patient was given the following medications:  Orders Placed This Encounter   Medications    naloxone (NALOXONE TO-GO) 4 mg/0.1 mL nasal spray       CONSULTS:  None    MEDICAL DECISIONMAKING / ASSESSMENT / Ruslan Levels is a 62 y.o. male presents to the emergency department after an overdose. Patient stated he snorted a material that he did not know what it was, but in review of records he has snorted fentanyl in the past and been brought to the emergency department after receiving Narcan. Patient was given Narcan by EMS. On arrival, the patient is awake and alert. He has no complaints. Patient was observed in the emergency department and had no further episodes of apnea. The patient will be instructed to refrain from illegal drug use and we instructed to follow-up with his primary care provider. Clinical Impression     1. Accidental drug overdose, initial encounter        Disposition     PATIENT REFERRED TO:  No follow-up provider specified.     DISCHARGE MEDICATIONS:  Current Discharge Medication List          DISPOSITION Decision To Discharge 10/04/2020 04:17:00 AM        Brenton King MD  10/04/20 0610

## 2020-10-04 NOTE — ED NOTES
Patient discharged in stable condition. Instructions and prescriptions reviewed. Given opportunity to ask questions if needed and patient verbalized understanding. All questions answered. Patient walked to waiting room where patient is going to use the phone to assist in finding a ride home.         Baron Ninfa RN  10/04/20 0774

## 2020-10-04 NOTE — ED NOTES
Bed: A03-03  Expected date:   Expected time:   Means of arrival:   Comments:  MAYANK 28[address] Kiko Funes RN  10/04/20 6910

## 2020-10-04 NOTE — ED NOTES
NALOXONE:  Patient Assessment and Dispensing Record   Date:  10/4/2020  Patient Name: Raul Fitzgerald  Patient Address: [address] [address]  [address] [facility]         Patient Selection: Check that the following conditions are met  [x]  The patient is an individual who is experiencing or at risk of experiencing an       opioid-related overdose. [x]  The individual receiving the information and medication is        oriented to person, place, and time and able to understand and learn the        essential components of overdose response and naloxone administration. Indication for dispensing naloxone  [x]  Previous opioid intoxication or overdose. [x]  History of nonmedical opioid use.   []  Initiation or cessation of methadone or buprenorphine for opioid use disorder        treatment. []  Higher-dose (>50 mg morphine equivalent/day) opioid prescription. []  Receiving any opioid prescription plus (select below):  [] Rotated from one opioid to another because of possible incomplete cross-tolerance. [] Smoking, COPD, emphysema, asthma, sleep apnea, respiratory infection or other respiratory illness. [] Renal dysfunction, hepatic disease, cardiac illness or HIV/AIDS. [] Known or suspected concurrent alcohol use. [] Concurrent benzodiazepine or other sedative prescription. [] Concurrent antidepressant prescription. [] Patients who may have difficulty accessing emergency medical services (distance, remoteness). [] Voluntary request from a family member, friend,  or other person in a position to assist an individual who is at risk of experiencing an opioid-related overdose. I (the undersigned) attest that:  [x]  I am authorized per hospital protocol to dispense naloxone to this patient. [x]  The individual has been screened for contraindications/precautions and        counseled appropriately. [x]  I have counseled the patient using the protocol approved informational pamphlet. [x]  I have updated the discharge record to reflect this dispensing of naloxone.      Signature:  Ofelia Almanza  10/4/2020, 4:24 AM            Ofelia Almanza RN  10/04/20 0425

## 2021-11-12 ENCOUNTER — OFFICE VISIT (OUTPATIENT)
Dept: INTERNAL MEDICINE CLINIC | Age: 61
End: 2021-11-12
Payer: COMMERCIAL

## 2021-11-12 VITALS
HEART RATE: 79 BPM | BODY MASS INDEX: 30.18 KG/M2 | SYSTOLIC BLOOD PRESSURE: 118 MMHG | OXYGEN SATURATION: 98 % | HEIGHT: 70 IN | RESPIRATION RATE: 14 BRPM | DIASTOLIC BLOOD PRESSURE: 80 MMHG | WEIGHT: 210.8 LBS

## 2021-11-12 DIAGNOSIS — Z12.11 SCREEN FOR COLON CANCER: ICD-10-CM

## 2021-11-12 DIAGNOSIS — M17.12 ARTHRITIS OF KNEE, LEFT: ICD-10-CM

## 2021-11-12 DIAGNOSIS — Z98.61 POST PTCA: ICD-10-CM

## 2021-11-12 DIAGNOSIS — K40.90 UNILATERAL INGUINAL HERNIA WITHOUT OBSTRUCTION OR GANGRENE, RECURRENCE NOT SPECIFIED: ICD-10-CM

## 2021-11-12 DIAGNOSIS — Z76.89 ENCOUNTER TO ESTABLISH CARE: Primary | ICD-10-CM

## 2021-11-12 PROCEDURE — 90471 IMMUNIZATION ADMIN: CPT | Performed by: INTERNAL MEDICINE

## 2021-11-12 PROCEDURE — 99204 OFFICE O/P NEW MOD 45 MIN: CPT | Performed by: INTERNAL MEDICINE

## 2021-11-12 PROCEDURE — 90732 PPSV23 VACC 2 YRS+ SUBQ/IM: CPT | Performed by: INTERNAL MEDICINE

## 2021-11-12 RX ORDER — ASPIRIN 81 MG/1
81 TABLET ORAL DAILY
Qty: 30 TABLET | Refills: 3 | Status: SHIPPED | OUTPATIENT
Start: 2021-11-12

## 2021-11-12 RX ORDER — ATORVASTATIN CALCIUM 40 MG/1
40 TABLET, FILM COATED ORAL DAILY
Qty: 30 TABLET | Refills: 3 | Status: SHIPPED | OUTPATIENT
Start: 2021-11-12

## 2021-11-12 RX ORDER — METOPROLOL SUCCINATE 25 MG/1
12.5 TABLET, EXTENDED RELEASE ORAL DAILY
Qty: 15 TABLET | Refills: 3 | Status: SHIPPED | OUTPATIENT
Start: 2021-11-12

## 2021-11-12 ASSESSMENT — ENCOUNTER SYMPTOMS
BACK PAIN: 1
SHORTNESS OF BREATH: 0
RHINORRHEA: 0
SORE THROAT: 0
DIARRHEA: 0
NAUSEA: 0
COUGH: 0
ABDOMINAL PAIN: 1
TROUBLE SWALLOWING: 0

## 2021-11-12 ASSESSMENT — PATIENT HEALTH QUESTIONNAIRE - PHQ9
SUM OF ALL RESPONSES TO PHQ9 QUESTIONS 1 & 2: 0
1. LITTLE INTEREST OR PLEASURE IN DOING THINGS: 0
SUM OF ALL RESPONSES TO PHQ QUESTIONS 1-9: 0
2. FEELING DOWN, DEPRESSED OR HOPELESS: 0

## 2021-11-12 NOTE — PROGRESS NOTES
Yony Swift (:  1960) is a 62 y.o. male, here for evaluation of the following chief complaint(s):    Established New Doctor and Abdominal Pain (left side abdominal pain, near groin area, movement hurts, dull ache at times sharp. )      ASSESSMENT/PLAN:  1. Encounter to establish care  Pneumovax 23 today  2. Post PTCA  Patient has been off his medications. Restart aspirin, statin, and take a beta-blocker. Advised him to get reestablished with cardiology  -     aspirin EC 81 MG EC tablet; Take 1 tablet by mouth daily, Disp-30 tablet, R-3Normal  -     atorvastatin (LIPITOR) 40 MG tablet; Take 1 tablet by mouth daily, Disp-30 tablet, R-3Normal  -     metoprolol succinate (TOPROL XL) 25 MG extended release tablet; Take 0.5 tablets by mouth daily, Disp-15 tablet, R-3Normal  -     Yobany Borden MD, Cardiology, [facility]  3. Arthritis of knee, left  Chart review indicates severe arthritis. X-rays done through [place] [address], Tabby Greene MD, Orthopedic Surgery, [facility]  4. Unilateral inguinal hernia without obstruction or gangrene, recurrence not specified  -     [facility]  5. Screen for colon cancer  -     [facility] Yudelka Nicholas MD, Gastroenterology, [facility]  6. Skin cancer  Left temple. Strongly suspected. Gave him dermatology referrals. Return in about 3 months (around 2022). SUBJECTIVE/OBJECTIVE:  HPI    Patient is here to get established. Chart extensively reviewed and updated. His chief complaint is a skin lesion at the left temple. He has previously been told it may be cancerous. He just recently got insurance so he is interested in a referral.      He also complains of left knee pain. He had an x-ray done at [facility].  He states it is uncomfortable to walk and requests handicapped parking placard. He has a history of heart disease. He has not been taking his medication or seen his cardiologist.        He also complains of left groin pain. He notes that he does a lot of heavy lifting and pulling.    --        Review of Systems   Constitutional: Negative for fatigue and fever. HENT: Negative for rhinorrhea, sore throat and trouble swallowing. Eyes: Negative for visual disturbance. Respiratory: Negative for cough and shortness of breath. Cardiovascular: Negative for chest pain and palpitations. Gastrointestinal: Positive for abdominal pain (left lower (groin)). Negative for diarrhea and nausea. Genitourinary: Negative for decreased urine volume, dysuria and frequency. Musculoskeletal: Positive for arthralgias and back pain. Negative for myalgias. Skin: Negative for rash. Allergic/Immunologic: Negative for immunocompromised state. Neurological: Negative for dizziness, numbness and headaches. Hematological: Does not bruise/bleed easily. Psychiatric/Behavioral: Negative for dysphoric mood and sleep disturbance. The patient is not nervous/anxious. Past Medical History:   Diagnosis Date    Hyperlipidemia     Pre-diabetes     STEMI (ST elevation myocardial infarction) ([facility] [address] 05/2019    inferior STEMI; subtotal RCA stented with 4 x 23 mm BMS. EF 20-25%       Current Outpatient Medications   Medication Sig Dispense Refill    aspirin EC 81 MG EC tablet Take 1 tablet by mouth daily 30 tablet 3    atorvastatin (LIPITOR) 40 MG tablet Take 1 tablet by mouth daily 30 tablet 3    metoprolol succinate (TOPROL XL) 25 MG extended release tablet Take 0.5 tablets by mouth daily 15 tablet 3     No current facility-administered medications for this visit. Physical Exam  Vitals and nursing note reviewed. Constitutional:       General: He is not in acute distress. Appearance: He is well-developed. HENT:      Head: Normocephalic and atraumatic.       Right Ear: External ear normal.      Left Ear: External ear normal. Eyes:      General: No scleral icterus. Neck:      Thyroid: No thyromegaly. Cardiovascular:      Rate and Rhythm: Normal rate and regular rhythm. Heart sounds: No murmur heard. Pulmonary:      Effort: No respiratory distress. Breath sounds: Normal breath sounds. No wheezing or rales. Abdominal:      General: Bowel sounds are normal. There is no distension. Palpations: Abdomen is soft. Tenderness: There is no abdominal tenderness. Genitourinary:     Comments: Left inguinal area-no obvious hernia palpated but suspect based on location and description of pain  Musculoskeletal:         General: No deformity. Normal range of motion. Cervical back: Normal range of motion. Lymphadenopathy:      Cervical: No cervical adenopathy. Skin:     General: Skin is warm and dry. Comments: Left temple-see photos under media    25 mm x 20 mm left temple brown raised  Red 20 cm x 20 cm     Neurological:      Mental Status: He is alert and oriented to person, place, and time. Cranial Nerves: No cranial nerve deficit. Sensory: No sensory deficit. Coordination: Coordination normal.   Psychiatric:         Thought Content: Thought content normal.             This note was generated completely or in part utilizing Dragon dictation speech recognition software. Occasionally, words are mistranscribed and despite editing, the text may contain inaccuracies due to incorrect word recognition. If further clarification is needed please contact the office at [phone]          An electronic signature was used to authenticate this note.     --Corrinne Cords, MD

## 2021-11-12 NOTE — LETTER
[facility]) Phys[facility] [address] [address]  Phone: [phone]  Fax: [phone]    Iam Wells MD         November 12, 2021     Patient: Annalee Chaves   YOB: 1960   Date of Visit: 11/12/2021       To Whom It May Concern: It is my medical opinion that Maria Luz [place] requires a disability parking placard for the following reasons:  He has limited walking ability due to an orthopedic condition. Duration of need: 6 months    If you have any questions or concerns, please don't hesitate to call.     Sincerely,        Iam Wells MD

## 2021-11-22 ENCOUNTER — OFFICE VISIT (OUTPATIENT)
Dept: SURGERY | Age: 61
End: 2021-11-22
Payer: COMMERCIAL

## 2021-11-22 VITALS
BODY MASS INDEX: 30.15 KG/M2 | DIASTOLIC BLOOD PRESSURE: 76 MMHG | WEIGHT: 210.6 LBS | HEIGHT: 70 IN | TEMPERATURE: 98.1 F | SYSTOLIC BLOOD PRESSURE: 129 MMHG | HEART RATE: 77 BPM

## 2021-11-22 DIAGNOSIS — R10.32 LEFT GROIN PAIN: Primary | ICD-10-CM

## 2021-11-22 PROCEDURE — 99203 OFFICE O/P NEW LOW 30 MIN: CPT | Performed by: SURGERY

## 2021-11-23 ENCOUNTER — TELEPHONE (OUTPATIENT)
Dept: SURGERY | Age: 61
End: 2021-11-23

## 2021-11-23 DIAGNOSIS — R10.32 LEFT GROIN PAIN: Primary | ICD-10-CM

## 2021-11-23 NOTE — TELEPHONE ENCOUNTER
LM informing patient of the ct scan order being placed and can call central scheduling at to schedule [phone].

## 2021-11-29 ENCOUNTER — TELEPHONE (OUTPATIENT)
Dept: SURGERY | Age: 61
End: 2021-11-29

## 2021-11-29 DIAGNOSIS — R10.32 LEFT GROIN PAIN: Primary | ICD-10-CM

## 2021-11-29 NOTE — TELEPHONE ENCOUNTER
Samreen Fleming with central scheduling called to ask for a STATorder for blood work. They need an order for creatnine and BUN testing prior to his CT.      Please call Samreen Fleming at [phone] with questions

## 2021-12-09 NOTE — PROGRESS NOTES
PATIENT NAME: Trenton Vgea OF BIRTH: 1960     TODAY'S DATE: 12/9/2021    Reason for Visit:  Left groin pain    Requesting Physician:  Galen Wray    HISTORY OF PRESENT ILLNESS:              The patient is a 62 y.o. male with a PMHx as delineated below who presents with a several month history of left groin pain. Pain is worse with activities. No bulge noted or obstructive complaints. Chief Complaint   Patient presents with   Eula Khadijah Patient     Unilateral inguinal hernia without obstruction or gangrene       REVIEW OF SYSTEMS:  CONSTITUTIONAL:  negative  HEENT:  negative  RESPIRATORY:  negative  CARDIOVASCULAR:  negative  GASTROINTESTINAL:  negative  GENITOURINARY:  negative  HEMATOLOGIC/LYMPHATIC:  negative  MUSCULOSKELETAL: negative  NEUROLOGICAL:  negative    PMH  Past Medical History:   Diagnosis Date    Hyperlipidemia     Pre-diabetes     STEMI (ST elevation myocardial infarction) ([facility] [address] 05/2019    inferior STEMI; subtotal RCA stented with 4 x 23 mm BMS. EF 20-25%       PSH  Past Surgical History:   Procedure Laterality Date    COLONOSCOPY  2012    ?  CORONARY ANGIOPLASTY WITH STENT PLACEMENT  2019    HAND SURGERY      SHOULDER SURGERY         Social History  Social History     Socioeconomic History    Marital status: Single     Spouse name: Not on file    Number of children: 11    Years of education: Not on file    Highest education level: Not on file   Occupational History    Occupation: maintenance   Tobacco Use    Smoking status: Never Smoker    Smokeless tobacco: Current User     Types: Chew   Vaping Use    Vaping Use: Never used   Substance and Sexual Activity    Alcohol use: Not Currently    Drug use: Yes     Types:  Other-see comments     Comment: none lately, ho cocaine    Sexual activity: Not on file   Other Topics Concern    Not on file   Social History Narrative    Lives with roommate     Social Determinants of Health     Financial Resource Strain:  Difficulty of Paying Living Expenses: Not on file   Food Insecurity:     Worried About Running Out of Food in the Last Year: Not on file    Ran Out of Food in the Last Year: Not on file   Transportation Needs:     Lack of Transportation (Medical): Not on file    Lack of Transportation (Non-Medical): Not on file   Physical Activity:     Days of Exercise per Week: Not on file    Minutes of Exercise per Session: Not on file   Stress:     Feeling of Stress : Not on file   Social Connections:     Frequency of Communication with Friends and Family: Not on file    Frequency of Social Gatherings with Friends and Family: Not on file    Attends Congregational Services: Not on file    Active Member of [address] [place] [address] [employer] or Organizations: Not on file    Attends Club or Organization Meetings: Not on file    Marital Status: Not on file   Intimate Partner Violence:     Fear of Current or Ex-Partner: Not on file    Emotionally Abused: Not on file    Physically Abused: Not on file    Sexually Abused: Not on file   Housing Stability:     Unable to Pay for Housing in the Last Year: Not on file    Number of [place] in the Last Year: Not on file    Unstable Housing in the Last Year: Not on file       Family History:   History reviewed. No pertinent family history.     Allergy: No Known Allergies    PHYSICAL EXAM:  VITALS:  /76   Pulse 77   Temp 98.1 °F (36.7 °C)   Ht 5' 10\" (1.778 m)   Wt 210 lb 9.6 oz (95.5 kg)   BMI 30.22 kg/m²     CONSTITUTIONAL:  alert, no apparent distress and mildly obese  EYES:  sclera clear  ENT:  normocepalic, without obvious abnormality  NECK:  supple, symmetrical, trachea midline and no carotid bruits  LUNGS:  clear to auscultation  CARDIOVASCULAR:  regular rate and rhythm and no murmur noted  ABDOMEN:  no scars, normal bowel sounds, soft, non-distended, non-tender, voluntary guarding absent, no masses palpated and hernia absent  MUSCULOSKELETAL:  0+ pitting edema lower

## 2021-12-16 ENCOUNTER — OFFICE VISIT (OUTPATIENT)
Dept: ORTHOPEDIC SURGERY | Age: 61
End: 2021-12-16
Payer: COMMERCIAL

## 2021-12-16 VITALS — BODY MASS INDEX: 29.35 KG/M2 | HEIGHT: 70 IN | WEIGHT: 205 LBS

## 2021-12-16 DIAGNOSIS — M17.12 PRIMARY OSTEOARTHRITIS OF LEFT KNEE: Primary | ICD-10-CM

## 2021-12-16 PROCEDURE — 99203 OFFICE O/P NEW LOW 30 MIN: CPT | Performed by: ORTHOPAEDIC SURGERY

## 2021-12-16 RX ORDER — OXYCODONE HYDROCHLORIDE AND ACETAMINOPHEN 5; 325 MG/1; MG/1
1 TABLET ORAL EVERY 6 HOURS PRN
Qty: 28 TABLET | Refills: 0 | Status: SHIPPED | OUTPATIENT
Start: 2021-12-16 | End: 2021-12-23

## 2021-12-16 NOTE — PROGRESS NOTES
Patient Name: Jonas Phillips  : 1960  DOS: 2021        Chief Complaint:   Chief Complaint   Patient presents with    Knee Pain     LEFT KNEE       History of Present Illness:  Jonas Phillips is a 62 y.o. male who presents with a chief complaint of continued complaints of pain in the knee with irritation with walking, stairs and with overuse. Pain in the knee regularly with swelling and discomfort. Increase in pain over the past several years time. 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 incrase in pain with work activities. No give way     xrays in [place] with noted medial joitn osteoarthritis. Medical History  Current Medications:   Prior to Admission medications    Medication Sig Start Date End Date Taking? Authorizing Provider   aspirin EC 81 MG EC tablet Take 1 tablet by mouth daily 21   Giselle Ayon MD   atorvastatin (LIPITOR) 40 MG tablet Take 1 tablet by mouth daily 21   Giselle Ayon MD   metoprolol succinate (TOPROL XL) 25 MG extended release tablet Take 0.5 tablets by mouth daily 21   Giselle Ayon MD     Allergies: No Known Allergies    Review of Systems:     Review of Systems ______  Constitutional: Negative for fever and diaphoresis. ____________  Respiratory: Negative for shortness of breath.  ________  Gastrointestinal: Negative for abdominal pain. ________  Musculoskeletal: Positive for joint pain. ____  Skin: Negative for itching. ____  Neurological: Negative for loss of consciousness. ______________  All other systems reviewed and negative     Past Medical History:   Diagnosis Date    Hyperlipidemia     Pre-diabetes     [facility]    inferior STEMI; subtotal RCA stented with 4 x 23 mm BMS. EF 20-25%     No family history on file.   Social History     Socioeconomic History    Marital status: Single     Spouse name: Not on file    Number of children: 5    Years of education: Not on file    Highest education level: Not on file   Occupational History    Occupation: maintenance   Tobacco Use    Smoking status: Never Smoker    Smokeless tobacco: Current User     Types: Chew   Vaping Use    Vaping Use: Never used   Substance and Sexual Activity    Alcohol use: Not Currently    Drug use: Yes     Types: Other-see comments     Comment: none lately, ho cocaine    Sexual activity: Not on file   Other Topics Concern    Not on file   Social History Narrative    Lives with roommate     Social Determinants of Health     Financial Resource Strain:     Difficulty of Paying Living Expenses: Not on file   Food Insecurity:     Worried About Running Out of Food in the Last Year: Not on file    Christina of Food in the Last Year: Not on file   Transportation Needs:     Lack of Transportation (Medical): Not on file    Lack of Transportation (Non-Medical):  Not on file   Physical Activity:     Days of Exercise per Week: Not on file    Minutes of Exercise per Session: Not on file   Stress:     Feeling of Stress : Not on file   Social Connections:     Frequency of Communication with Friends and Family: Not on file    Frequency of Social Gatherings with Friends and Family: Not on file    Attends Hindu Services: Not on file    Active Member of [address] [place] [address] [employer] or Organizations: Not on file    Attends Club or Organization Meetings: Not on file    Marital Status: Not on file   Intimate Partner Violence:     Fear of Current or Ex-Partner: Not on file    Emotionally Abused: Not on file    Physically Abused: Not on file    Sexually Abused: Not on file   Housing Stability:     Unable to Pay for Housing in the Last Year: Not on file    Number of [place] in the Last Year: Not on file    Unstable Housing in the Last Year: Not on file         Physical Exam __  Constitutional: She is oriented to person, place, and time and well-developed, well-nourished, and in no discussed treatment options which would include anti-inflammatory medication, physical therapy, bracing, cortisone injections, and Visco supplementation. We have also discussed the benefits of low impact exercise and weight loss. We have also discussed the possibility of joint replacement surgery in the future. Have discussed no additional percocet prescription. But can do one time.             Rosa Benson MD

## 2021-12-17 ENCOUNTER — TELEPHONE (OUTPATIENT)
Dept: SURGERY | Age: 61
End: 2021-12-17

## 2021-12-17 NOTE — TELEPHONE ENCOUNTER
Pt called informs he received a letter that his CT scan was denied. Apparently not enough information was received. He is scheduled for Monday, 12/20/21. Pt is not sure what he needs to do. He is willing to set up payments and pay out of pocket if needed. He wants to get this taken care of. Pt contact [phone].

## 2021-12-22 ENCOUNTER — TELEPHONE (OUTPATIENT)
Dept: INTERNAL MEDICINE CLINIC | Age: 61
End: 2021-12-22

## 2021-12-22 DIAGNOSIS — R10.30 INGUINAL PAIN, UNSPECIFIED LATERALITY: Primary | ICD-10-CM

## 2021-12-22 NOTE — TELEPHONE ENCOUNTER
CT scan was ordered by Dr. Edilia Patrick. Forwarding there to see if there is additional clinical information needs to be provided so the patient does not have to pay out of pocket. It appears the test is $996 per computer system (under financial).

## 2021-12-22 NOTE — TELEPHONE ENCOUNTER
Patient called stating he was trying to schedule his CT appointment at [facility]. and ran into some issues with his insurance which he was not clear about so he wanted to know the [facility] cost for this scan he is asking for a CPT code.

## 2021-12-29 ENCOUNTER — HOSPITAL ENCOUNTER (OUTPATIENT)
Dept: CT IMAGING | Age: 61
Discharge: HOME | End: 2021-12-29
Payer: COMMERCIAL

## 2021-12-29 DIAGNOSIS — R10.32 LEFT GROIN PAIN: ICD-10-CM

## 2021-12-29 PROCEDURE — 72192 CT PELVIS W/O DYE: CPT

## 2022-01-05 ENCOUNTER — TELEPHONE (OUTPATIENT)
Dept: SURGERY | Age: 62
End: 2022-01-05

## 2022-01-05 NOTE — TELEPHONE ENCOUNTER
Pt called in to discuss the results from his CT. He is in a lot of pain and is asking to speak to Dr. Noel Gonzalez about what is causing it and how it can be treated.      Please call him back at [phone]

## 2022-01-05 NOTE — TELEPHONE ENCOUNTER
Attempted to return call, no voicemail. Patient will need to set up office visit to go over results with Dr. Charo Toledo.

## 2022-01-07 ENCOUNTER — OFFICE VISIT (OUTPATIENT)
Dept: SURGERY | Age: 62
End: 2022-01-07
Payer: COMMERCIAL

## 2022-01-07 VITALS
SYSTOLIC BLOOD PRESSURE: 139 MMHG | HEIGHT: 70 IN | HEART RATE: 73 BPM | DIASTOLIC BLOOD PRESSURE: 95 MMHG | BODY MASS INDEX: 31.04 KG/M2 | TEMPERATURE: 98.1 F | WEIGHT: 216.8 LBS

## 2022-01-07 DIAGNOSIS — K40.90 LEFT INGUINAL HERNIA: Primary | ICD-10-CM

## 2022-01-07 PROCEDURE — 99214 OFFICE O/P EST MOD 30 MIN: CPT | Performed by: SURGERY

## 2022-01-07 NOTE — PROGRESS NOTES
PATIENT NAME: Lopez Fish OF BIRTH: 1960     TODAY'S DATE: 1/7/2022    Reason for Visit:  Left groin pain    Requesting Physician:  Lucy Rob    HISTORY OF PRESENT ILLNESS:              The patient is a 62 y.o. male with a PMHx as delineated below who presents with a several month history of left groin pain. Pain is worse with activities. No bulge noted or obstructive complaints. He returns today following CT imaging. Chief Complaint   Patient presents with    Follow-up     review test results        REVIEW OF SYSTEMS:  CONSTITUTIONAL:  negative  HEENT:  negative  RESPIRATORY:  negative  CARDIOVASCULAR:  negative  GASTROINTESTINAL:  Negative except for groin pain  GENITOURINARY:  negative  HEMATOLOGIC/LYMPHATIC:  negative  MUSCULOSKELETAL: negative  NEUROLOGICAL:  negative    PMH  Past Medical History:   Diagnosis Date    Hyperlipidemia     Pre-diabetes     STEMI (ST elevation myocardial infarction) ([facility] [address] 05/2019    inferior STEMI; subtotal RCA stented with 4 x 23 mm BMS. EF 20-25%       PSH  Past Surgical History:   Procedure Laterality Date    COLONOSCOPY  2012    ?  CORONARY ANGIOPLASTY WITH STENT PLACEMENT  2019    HAND SURGERY      SHOULDER SURGERY         Social History  Social History     Socioeconomic History    Marital status: Single     Spouse name: Not on file    Number of children: 11    Years of education: Not on file    Highest education level: Not on file   Occupational History    Occupation: maintenance   Tobacco Use    Smoking status: Never Smoker    Smokeless tobacco: Current User     Types: Chew   Vaping Use    Vaping Use: Never used   Substance and Sexual Activity    Alcohol use: Not Currently    Drug use: Yes     Types:  Other-see comments     Comment: none lately, ho cocaine    Sexual activity: Not on file   Other Topics Concern    Not on file   Social History Narrative    Lives with roommate     Social Determinants of Health     Financial Resource Strain:     Difficulty of Paying Living Expenses: Not on file   Food Insecurity:     Worried About Running Out of Food in the Last Year: Not on file    Christina of Food in the Last Year: Not on file   Transportation Needs:     Lack of Transportation (Medical): Not on file    Lack of Transportation (Non-Medical): Not on file   Physical Activity:     Days of Exercise per Week: Not on file    Minutes of Exercise per Session: Not on file   Stress:     Feeling of Stress : Not on file   Social Connections:     Frequency of Communication with Friends and Family: Not on file    Frequency of Social Gatherings with Friends and Family: Not on file    Attends Christian Services: Not on file    Active Member of [address] [place] [address] or Organizations: Not on file    Attends Club or Organization Meetings: Not on file    Marital Status: Not on file   Intimate Partner Violence:     Fear of Current or Ex-Partner: Not on file    Emotionally Abused: Not on file    Physically Abused: Not on file    Sexually Abused: Not on file   Housing Stability:     Unable to Pay for Housing in the Last Year: Not on file    Number of [place] in the Last Year: Not on file    Unstable Housing in the Last Year: Not on file       Family History:   No family history on file.     Allergy: No Known Allergies    PHYSICAL EXAM:  VITALS:  BP (!) 139/95   Pulse 73   Temp 98.1 °F (36.7 °C)   Ht 5' 10\" (1.778 m)   Wt 216 lb 12.8 oz (98.3 kg)   BMI 31.11 kg/m²     CONSTITUTIONAL:  alert, no apparent distress and mildly obese  EYES:  sclera clear  ENT:  normocepalic, without obvious abnormality  NECK:  supple, symmetrical, trachea midline and no carotid bruits  LUNGS:  clear to auscultation  CARDIOVASCULAR:  regular rate and rhythm and no murmur noted  ABDOMEN:  no scars, normal bowel sounds, soft, non-distended, non-tender, voluntary guarding absent, no masses palpated and hernia absent  MUSCULOSKELETAL:  0+ pitting edema lower extremities  NEUROLOGIC:  Mental Status Exam:  Level of Alertness:   awake  Orientation:   person, place, time  SKIN:  no bruising or bleeding and normal skin color, texture, turgor    Radiology Review:  12/29/21 - CT Abdomen/Pelvis:  Stranding of the fat within the small left inguinal hernia presumably due to fat necrosis. IMPRESSION/RECOMMENDATIONS:    Patient with a symptomatic left inguinal hernia for which I have recommended repair. We discussed the surgical options and have elected to proceed with laparoscopic repair. We discussed the risk, benefits, and expected recovery from surgery and he wishes to proceed. Nickolas Baca MD     I have seen, examined, and reviewed the patients chart. I agree with the residents assessment and have made appropriate changes.     Liv Pastor

## 2022-01-07 NOTE — LETTER
P - [facility], M.D. [facility]  [address]  [address] [facility]. [address]  Phone: [phone] Fax: [phone]            Surgery Order/Time:  1/10/22/1:39 PM  Conf. # _________________  Scheduled by: Mark Gonzalez Lpn                               **Pre-Surgical Orders in [facility]**  Facility: [facility].    Surgery Date:  Time: 1030am    Pt arrival: 0830  Second Surgeon: N/A        Patient Name: Declan Lares  : 1960  Home Ph:[phone] (home)  Address:[address] [place] [address] [address]  [place] [id number]  PCP:  Estephania Martinez MD   Does patient speak English?: yes    needed? no                                             PROCEDURE: Laparoscopic left inguinal hernia repair  CPT: 50897: Laparoscopic Inguinal Hernia Repair    DIAGNOSIS:      ICD-10-CM    1. Left inguinal hernia  K40.90        Anesthesia: General  Time Needed:  1 hour   Pt Position:  supine      Outpatient __x__ Admit ______  Assist._____   Cardiac Clearance: no  Patient to meet with Anesthesiology prior to surgery: no    Patient Allergies: No Known Allergies  Pre-Op H&P to be done by: Estephania Martinez MD  Pre-Op Antibiotics: Ancef: 2g IV On Call to OR      Physician: Jorge Curtis MD Date: 01/10/22             Insurance:     ID #      Ph #   Date called ________________   Stacey Haven to: _____________ Precert Needed?  Yes  /  No  PreAuth # & Details   ______________________________________________________________________

## 2022-01-12 ENCOUNTER — TELEPHONE (OUTPATIENT)
Dept: SURGERY | Age: 62
End: 2022-01-12

## 2022-01-12 NOTE — TELEPHONE ENCOUNTER
Patient seen on 1/7/22 surgery letter received Laparoscopic left inguinal hernia repair 1Hr OR time needed under general.    Covid vaccinated     Pre op instructions reviewed including the need for a H&P with a EXG, 18yr or older  for day of surgery.    Pre op packet mailed    Post op appt scheduled     Faxed to scheduling     Placed on calender and [place]

## 2022-02-01 ENCOUNTER — TELEPHONE (OUTPATIENT)
Dept: INTERNAL MEDICINE CLINIC | Age: 62
End: 2022-02-01

## 2022-02-01 NOTE — TELEPHONE ENCOUNTER
----- Message from Leana Miller sent at 1/31/2022  4:32 PM EST -----  Subject: Appointment Request    Reason for Call: Routine Pre-Op    QUESTIONS  Type of Appointment? Established Patient  Reason for appointment request? Available appointments did not meet   patient need  Additional Information for Provider? Preop needs to be done prior to the   2/16 which is the surgery date Please advise if he can be worked in   ---------------------------------------------------------------------------  --------------  [employer]  What is the best way for the office to contact you? OK to leave message on   voicemail  Preferred Call Back Phone Number? [phone]  ---------------------------------------------------------------------------  --------------  SCRIPT ANSWERS  Relationship to Patient? Self  Do you have questions for your provider that need to be answered prior to   scheduling your pre-op appointment? No  Have you been diagnosed with, awaiting test results for, or told that you   are suspected of having COVID-19 (Coronavirus)? (If patient has tested   negative or was tested as a requirement for work, school, or travel and   not based on symptoms, answer no)? No  Within the past two weeks have you developed any of the following symptoms   (answer no if symptoms have been present longer than 2 weeks or began   more than 2 weeks ago)? Fever or Chills, Cough, Shortness of breath or   difficulty breathing, Loss of taste or smell, Sore throat, Nasal   congestion, Sneezing or runny nose, Fatigue or generalized body aches   (answer no if pain is specific to a body part e.g. back pain), Diarrhea,   Headache? No  Have you had close contact with someone with COVID-19 in the last 14 days? No  (Service Expert  click yes below to proceed with [employer]ual   Scheduling)?  Yes

## 2022-02-01 NOTE — TELEPHONE ENCOUNTER
----- Message from Nathaniel Awad sent at 1/31/2022  4:32 PM EST -----  Subject: Appointment Request    Reason for Call: Routine Pre-Op    QUESTIONS  Type of Appointment? Established Patient  Reason for appointment request? Available appointments did not meet   patient need  Additional Information for Provider? Preop needs to be done prior to the   2/16 which is the surgery date Please advise if he can be worked in   ---------------------------------------------------------------------------  --------------  [employer]  What is the best way for the office to contact you? OK to leave message on   voicemail  Preferred Call Back Phone Number? [phone]  ---------------------------------------------------------------------------  --------------  SCRIPT ANSWERS  Relationship to Patient? Self  Do you have questions for your provider that need to be answered prior to   scheduling your pre-op appointment? No  Have you been diagnosed with, awaiting test results for, or told that you   are suspected of having COVID-19 (Coronavirus)? (If patient has tested   negative or was tested as a requirement for work, school, or travel and   not based on symptoms, answer no)? No  Within the past two weeks have you developed any of the following symptoms   (answer no if symptoms have been present longer than 2 weeks or began   more than 2 weeks ago)? Fever or Chills, Cough, Shortness of breath or   difficulty breathing, Loss of taste or smell, Sore throat, Nasal   congestion, Sneezing or runny nose, Fatigue or generalized body aches   (answer no if pain is specific to a body part e.g. back pain), Diarrhea,   Headache? No  Have you had close contact with someone with COVID-19 in the last 14 days? No  (Service Expert  click yes below to proceed with [employer]   Scheduling)?  Yes

## 2022-02-04 ENCOUNTER — OFFICE VISIT (OUTPATIENT)
Dept: INTERNAL MEDICINE CLINIC | Age: 62
End: 2022-02-04
Payer: COMMERCIAL

## 2022-02-04 VITALS
BODY MASS INDEX: 31.35 KG/M2 | OXYGEN SATURATION: 98 % | SYSTOLIC BLOOD PRESSURE: 122 MMHG | DIASTOLIC BLOOD PRESSURE: 86 MMHG | RESPIRATION RATE: 14 BRPM | WEIGHT: 219 LBS | HEIGHT: 70 IN | HEART RATE: 73 BPM

## 2022-02-04 DIAGNOSIS — Z98.61 POST PTCA: ICD-10-CM

## 2022-02-04 DIAGNOSIS — Z01.818 PREOP EXAMINATION: Primary | ICD-10-CM

## 2022-02-04 DIAGNOSIS — Z11.59 SCREENING FOR VIRAL DISEASE: ICD-10-CM

## 2022-02-04 PROCEDURE — 93000 ELECTROCARDIOGRAM COMPLETE: CPT | Performed by: INTERNAL MEDICINE

## 2022-02-04 PROCEDURE — 99213 OFFICE O/P EST LOW 20 MIN: CPT | Performed by: INTERNAL MEDICINE

## 2022-02-04 SDOH — ECONOMIC STABILITY: FOOD INSECURITY: WITHIN THE PAST 12 MONTHS, THE FOOD YOU BOUGHT JUST DIDN'T LAST AND YOU DIDN'T HAVE MONEY TO GET MORE.: NEVER TRUE

## 2022-02-04 SDOH — ECONOMIC STABILITY: FOOD INSECURITY: WITHIN THE PAST 12 MONTHS, YOU WORRIED THAT YOUR FOOD WOULD RUN OUT BEFORE YOU GOT MONEY TO BUY MORE.: NEVER TRUE

## 2022-02-04 ASSESSMENT — ENCOUNTER SYMPTOMS
SORE THROAT: 0
ABDOMINAL PAIN: 1
RHINORRHEA: 0
NAUSEA: 0
SHORTNESS OF BREATH: 0
COUGH: 0
DIARRHEA: 0
BACK PAIN: 1
TROUBLE SWALLOWING: 0

## 2022-02-04 ASSESSMENT — SOCIAL DETERMINANTS OF HEALTH (SDOH): HOW HARD IS IT FOR YOU TO PAY FOR THE VERY BASICS LIKE FOOD, HOUSING, MEDICAL CARE, AND HEATING?: NOT VERY HARD

## 2022-02-04 NOTE — PROGRESS NOTES
Preoperative Consultation      Leona Nageotte  YOB: 1960    Date of Service:  2/4/2022    Vitals:    02/04/22 1306   BP: 122/86   Pulse: 73   Resp: 14   SpO2: 98%   Weight: 219 lb (99.3 kg)  Comment: heavy boots on   Height: 5' 10\" (1.778 m)           Chief Complaint   Patient presents with    Pre-op Exam     Hernia repair 2/16 @[facility] with Dr. Brigette Otto        HPI:    This patient presents tot office today for a preoperative consultation at the request of surgeon, Dr. Brigette Otto, who plans on performing [address] on February 16 at [place] [address].      Planned anesthesia: General   Known anesthesia problems: None   Bleeding risk: No recent or remote history of abnormal bleeding  Personal or FH of DVT/PE: No   Recent steroid use: no  Exercise tolerance: greater than 4 METs   Patient objection to receiving blood products: No      Past Medical History:   Diagnosis Date    Hyperlipidemia     Pre-diabetes     STEMI (ST elevation myocardial infarction) ([facility] [address] 05/2019    inferior STEMI; subtotal RCA stented with 4 x 23 mm BMS. EF 20-25%, repeat 7/2019=55%     Past Surgical History:   Procedure Laterality Date    COLONOSCOPY  2012    ?  CORONARY ANGIOPLASTY WITH STENT PLACEMENT  2019    HAND SURGERY Left     in his 35s    SHOULDER SURGERY Left     in his 35s     History reviewed. No pertinent family history. Social History     Tobacco Use    Smoking status: Never Smoker    Smokeless tobacco: Current User     Types: Chew   Vaping Use    Vaping Use: Never used   Substance Use Topics    Alcohol use: Not Currently    Drug use: Yes     Types:  Other-see comments     Comment: none lately, ho cocaine       Outpatient Medications Marked as Taking for the 2/4/22 encounter (Office Visit) with Dafne Blankenship MD   Medication Sig Dispense Refill    aspirin EC 81 MG EC tablet Take 1 tablet by mouth daily 30 tablet 3    atorvastatin (LIPITOR) 40 MG tablet Take 1 tablet by mouth daily 30 tablet 3    metoprolol succinate (TOPROL XL) 25 MG extended release tablet Take 0.5 tablets by mouth daily 15 tablet 3     No Known Allergies    Review of Systems   Constitutional: Negative for fatigue and fever. HENT: Negative for rhinorrhea, sore throat and trouble swallowing. Eyes: Negative for visual disturbance. Respiratory: Negative for cough and shortness of breath. Cardiovascular: Negative for chest pain and palpitations. Gastrointestinal: Positive for abdominal pain. Negative for diarrhea (gnawing discomfort from the hernia) and nausea. Genitourinary: Negative for decreased urine volume, dysuria and frequency. Musculoskeletal: Positive for back pain. Negative for arthralgias and myalgias. Skin: Negative for rash. Allergic/Immunologic: Negative for immunocompromised state. Neurological: Negative for dizziness, numbness and headaches. Hematological: Does not bruise/bleed easily. Psychiatric/Behavioral: Negative for dysphoric mood and sleep disturbance. The patient is not nervous/anxious. Physical Exam  Vitals and nursing note reviewed. Constitutional:       General: He is not in acute distress. Appearance: He is well-developed. HENT:      Head: Normocephalic and atraumatic. Right Ear: External ear normal.      Left Ear: External ear normal.   Eyes:      General: No scleral icterus. Extraocular Movements: Extraocular movements intact. Neck:      Thyroid: No thyromegaly. Cardiovascular:      Rate and Rhythm: Normal rate and regular rhythm. Heart sounds: No murmur heard. Pulmonary:      Effort: No respiratory distress. Breath sounds: Normal breath sounds. No wheezing or rales. Abdominal:      General: Bowel sounds are normal. There is no distension. Palpations: Abdomen is soft. Tenderness: There is abdominal tenderness (mild llq). Musculoskeletal:         General: No deformity.  Normal range of motion. Cervical back: Normal range of motion. Lymphadenopathy:      Cervical: No cervical adenopathy. Skin:     General: Skin is warm and dry. Findings: Lesion (several, see derm as prev advised) present. Neurological:      Mental Status: He is alert and oriented to person, place, and time. Cranial Nerves: No cranial nerve deficit. Sensory: No sensory deficit. Coordination: Coordination normal.   Psychiatric:         Thought Content: Thought content normal.                Assessment/Plan:     1. Preop examination     Pre-Operative Risk assessment using 2014 ACC/AHA guidelines     EKG NSR, Labs pending    Emergent procedure No  Active Cardiac Condition No (decompensated HF, Arrhythmia, MI <3 weeks, severe valve disease)  Risk Level of Procedure Intermediate Risk (intraperitoneal, intrathoracic, HENT, orthopedic, or carotid endarterectomy, etc.)  Revised Cardiac Risk Index Risk factors: History of ischemic heart disease  Measurement of Exercise Tolerance before Surgery >4 Yes    According to the 2014 ACC/AHA pre-operative risk assessment guidelines Byron Leonardo is a low to intermediate risk for major cardiac complications during an intermediate risk procedure and may continue as planned. Specific medication recommendations are listed below: may continue aspirin 81 mg daily - okayed this with surgeon. Take Toprol the morning of surgery with a small sip of water.

## 2022-02-09 RX ORDER — MELOXICAM 15 MG/1
15 TABLET ORAL DAILY
COMMUNITY
Start: 2021-09-07

## 2022-02-09 NOTE — PROGRESS NOTES
[facility] patients having surgery or anesthesia are required to be Covid tested OR to have been vaccinated at least 14 days prior to your procedure. It is very important to return our call to [phone] and notify the staff of your last vaccination date otherwise you will be required to complete Covid PCR test within the 5-6 days prior to surgery & quarantine. The results will need to be faxed to PreAdmission Testing at [phone]. PRIOR TO PROCEDURE DATE:        1. PLEASE FOLLOW ANY  GUIDELINES/ INSTRUCTIONS PRIOR TO YOUR PROCEDURE AS ADVISED BY YOUR SURGEON. 2. Arrange for someone to drive you home and be with you for the first 24 hours after discharge for your safety after your procedure for which you received sedation. Ensure it is someone we can share information with regarding your discharge. 3. You must contact your surgeon for instructions IF:   You are taking any blood thinners, aspirin, anti-inflammatory or vitamin E.   There is a change in your physical condition such as a cold, fever, rash, cuts, sores or any other infection, especially near your surgical site. 4. Do not drink alcohol the day before or day of your procedure. 5. A Pre-op History and Physical for surgery MUST be completed by your Physician or Urgent Care within 30 days of your procedure date. Please bring a copy with you on the day of your procedure and along with any other testing performed. THE DAY OF YOUR PROCEDURE:  1. Follow instructions for ARRIVAL TIME as DIRECTED BY YOUR SURGEON. 2. Enter the MAIN entrance from [employer] and follow the signs to the free [employer] or [employer] parking (offered free of charge 6am-5pm). 3. Enter the Main Entrance of the hospital (do not enter from the lower level of the parking garage). Upon entrance, check in with the  at the main desk on your left. If no one is available at the desk, proceed into the [facility] Waiting Room and go through the door directly into the [facility]. There is a Check-in desk ACROSS from Room 5 (marked with a sign hanging from the ceiling). The phone number for the surgery center is [phone]. 4. Please call [phone] option #2 option #2 if you have not been preregistered yet. On the day of your procedure bring your insurance card and photo ID. You will be registered at your bedside once brought back to your room. 5. DO NOT EAT ANYTHING eight hours prior to your arrival for surgery. May have 8 ounces of water 4 hours prior to your arrival for surgery. NOTE: ALL Gastric, Bariatric and Bowel surgery patients MUST follow their surgeon's instructions. 6. MEDICATIONS    Take the following medications with a SMALL sip of water: METOPROLOL    Bariatric patient's call surgeon if on diabetic medications as some need to be stopped 1 week preop   Use your usual dose of inhalers the morning of surgery. BRING your rescue inhaler with you to hospital.    Anesthesia does NOT want you to take insulin the morning of surgery. They will control your blood sugar while you are at the hospital. Please contact your ordering physician for instructions regarding your insulin the night before your procedure. If you have an insulin pump, please keep it set on basal rate. 7. Do not swallow water when brushing teeth. No gum, candy, mints or ice chips. Refrain from smoking or at least decrease the amount. 8. Dress in loose, comfortable clothing appropriate for redressing after your procedure. Do not wear jewelry (including body piercings), make-up (especially NO eye make-up), fingernail polish (NO toenail polish if foot/leg surgery), lotion, powders or metal hairclips. 9. Dentures, glasses, or contacts will need to be removed before your procedure.  Bring cases for your glasses, contacts, dentures, or hearing aids to protect them while you are in surgery. 10. If you use a CPAP, please bring it with you on the day of your procedure. 11. We recommend that valuable personal  belongings such as cash, cell phones, e-tablets or jewelry, be left at home during your stay. The hospital will not be responsible for valuables that are not secured in the hospital safe. However, if your insurance requires a co-pay, you may want to bring a method of payment, i.e. Check or credit card, if you wish to pay your co-pay the day of surgery. 12. If you are to stay overnight, you may bring a bag with personal items. Please have any large items you may need brought in by your family after your arrival to your hospital room. 15. If you have a Living Will or Durable Power of , please bring a copy on the day of your procedure. 15. With your permission, one family member may accompany you while you are being prepared for surgery. Once you are ready, additional family members may join you. HOW WE KEEP YOU SAFE and WORK TO PREVENT SURGICAL SITE INFECTIONS:  1. Health care workers should always check your ID bracelet to verify your name and birth date. You will be asked many times to state your name, date of birth, and allergies. 2. Health care workers should always clean their hands with soap or alcohol gel before providing care to you. It is okay to ask anyone if they cleaned their hands before they touch you. 3. You will be actively involved in verifying the type of procedure you are having and ensuring the correct surgical site. This will be confirmed multiple times prior to your procedure. Do NOT cristal your surgery site UNLESS instructed to by your surgeon. 4. Do not shave or wax for 72 hours prior to procedure near your operative site. Shaving with a razor can irritate your skin and make it easier to develop an infection.  On the day of your procedure, any hair that needs to be removed near the surgical site will be clipped by a healthcare worker using a special clippers designed to avoid skin irritation. 5. When you are in the operating room, your surgical site will be cleansed with a special soap, and in most cases, you will be given an antibiotic before the surgery begins. What to expect AFTER YOUR PROCEDURE:  1. Immediately following your procedure, your will be taken to the PACU for the first phase of your recovery. Your nurse will help you recover from any potential side effects of anesthesia, such as extreme drowsiness, changes in your vital signs or breathing patterns. Nausea, headache, muscle aches, or sore throat may also occur after anesthesia. Your nurse will help you manage these potential side effects. 2. For comfort and safety, arrange to have someone at home with you for the first 24 hours after discharge. 3. You and your family will be given written instructions about your diet, activity, dressing care, medications, and return visits. 4. Once at home, should issues with nausea, pain, or bleeding occur, or should you notice any signs of infection, you should call your surgeon. 5. Always clean your hands before and after caring for your wound. Do not let your family touch your surgery site without cleaning their hands. 6. Narcotic pain medications can cause significant constipation. You may want to add a stool softener to your postoperative medication schedule or speak to your surgeon on how best to manage this SIDE EFFECT. SPECIAL INSTRUCTIONS patient acknowledges understanding of pre op instructions. Thank you for allowing us to care for you. We strive to exceed your expectations in the delivery of care and service provided to you and your family. If you need to contact the [place] [address] staff for any reason, please call us at [phone]    Instructions reviewed with patient during preadmission testing phone interview.   Shazia Carvalho RN.2/9/2022 .9:30 AM      ADDITIONAL EDUCATIONAL INFORMATION REVIEWED PER PHONE WITH YOU AND/OR YOUR FAMILY:  Yes Hibiclens® Bathing Instructions   No Antibacterial Soap

## 2022-02-09 NOTE — PROGRESS NOTES
Patient states covid +  1/10/2022 done at an urgent care and negative 1/18/2022. I instructed him to send the results to us before surgery, email and fax # given to him (he requested that we email to him at [email or web address]) or he might stop by the hospital and drop it off. Patient states he is asymptomatic at this time. -db    Labs ordered by PCP when H&P done are pending. -db    2.11.22 @ 6502 called patient and re-requested both covid results be emailed in to [place].  States not at home but will do before Monday /ag

## 2022-02-15 ENCOUNTER — ANESTHESIA EVENT (OUTPATIENT)
Dept: OPERATING ROOM | Age: 62
End: 2022-02-15
Payer: COMMERCIAL

## 2022-02-16 ENCOUNTER — ANESTHESIA (OUTPATIENT)
Dept: OPERATING ROOM | Age: 62
End: 2022-02-16
Payer: COMMERCIAL

## 2022-02-16 ENCOUNTER — HOSPITAL ENCOUNTER (OUTPATIENT)
Age: 62
Setting detail: OUTPATIENT SURGERY
Discharge: HOME | End: 2022-02-16
Attending: SURGERY | Admitting: SURGERY
Payer: COMMERCIAL

## 2022-02-16 VITALS
HEART RATE: 74 BPM | SYSTOLIC BLOOD PRESSURE: 125 MMHG | WEIGHT: 215 LBS | HEIGHT: 70 IN | DIASTOLIC BLOOD PRESSURE: 81 MMHG | OXYGEN SATURATION: 96 % | RESPIRATION RATE: 18 BRPM | BODY MASS INDEX: 30.78 KG/M2 | TEMPERATURE: 96.8 F

## 2022-02-16 VITALS — OXYGEN SATURATION: 97 % | TEMPERATURE: 97.2 F | SYSTOLIC BLOOD PRESSURE: 131 MMHG | DIASTOLIC BLOOD PRESSURE: 75 MMHG

## 2022-02-16 DIAGNOSIS — G89.18 ACUTE POST-OPERATIVE PAIN: Primary | ICD-10-CM

## 2022-02-16 PROCEDURE — 2580000003 HC RX 258: Performed by: NURSE ANESTHETIST, CERTIFIED REGISTERED

## 2022-02-16 PROCEDURE — C1781 MESH (IMPLANTABLE): HCPCS | Performed by: SURGERY

## 2022-02-16 PROCEDURE — 7100000010 HC PHASE II RECOVERY - FIRST 15 MIN: Performed by: SURGERY

## 2022-02-16 PROCEDURE — 2580000003 HC RX 258: Performed by: SURGERY

## 2022-02-16 PROCEDURE — 3700000000 HC ANESTHESIA ATTENDED CARE: Performed by: SURGERY

## 2022-02-16 PROCEDURE — 3700000001 HC ADD 15 MINUTES (ANESTHESIA): Performed by: SURGERY

## 2022-02-16 PROCEDURE — 3600000004 HC SURGERY LEVEL 4 BASE: Performed by: SURGERY

## 2022-02-16 PROCEDURE — 2709999900 HC NON-CHARGEABLE SUPPLY: Performed by: SURGERY

## 2022-02-16 PROCEDURE — 6360000002 HC RX W HCPCS: Performed by: ANESTHESIOLOGY

## 2022-02-16 PROCEDURE — 6370000000 HC RX 637 (ALT 250 FOR IP): Performed by: ANESTHESIOLOGY

## 2022-02-16 PROCEDURE — 7100000011 HC PHASE II RECOVERY - ADDTL 15 MIN: Performed by: SURGERY

## 2022-02-16 PROCEDURE — 2500000003 HC RX 250 WO HCPCS: Performed by: SURGERY

## 2022-02-16 PROCEDURE — 7100000000 HC PACU RECOVERY - FIRST 15 MIN: Performed by: SURGERY

## 2022-02-16 PROCEDURE — 6360000002 HC RX W HCPCS: Performed by: SURGERY

## 2022-02-16 PROCEDURE — 3600000014 HC SURGERY LEVEL 4 ADDTL 15MIN: Performed by: SURGERY

## 2022-02-16 PROCEDURE — 49650 LAP ING HERNIA REPAIR INIT: CPT | Performed by: SURGERY

## 2022-02-16 PROCEDURE — C1713 ANCHOR/SCREW BN/BN,TIS/BN: HCPCS | Performed by: SURGERY

## 2022-02-16 PROCEDURE — 6360000002 HC RX W HCPCS: Performed by: NURSE ANESTHETIST, CERTIFIED REGISTERED

## 2022-02-16 PROCEDURE — A4217 STERILE WATER/SALINE, 500 ML: HCPCS | Performed by: SURGERY

## 2022-02-16 PROCEDURE — 2580000003 HC RX 258: Performed by: ANESTHESIOLOGY

## 2022-02-16 PROCEDURE — 2500000003 HC RX 250 WO HCPCS: Performed by: NURSE ANESTHETIST, CERTIFIED REGISTERED

## 2022-02-16 PROCEDURE — 7100000001 HC PACU RECOVERY - ADDTL 15 MIN: Performed by: SURGERY

## 2022-02-16 DEVICE — MESH HERN L W10.8XL16CM L INGUINAL WHT POLYPR MFIL: Type: IMPLANTABLE DEVICE | Site: GROIN | Status: FUNCTIONAL

## 2022-02-16 DEVICE — SYSTEM PERM FIX L37CM 15 FAST CAPSUR: Type: IMPLANTABLE DEVICE | Site: GROIN | Status: FUNCTIONAL

## 2022-02-16 RX ORDER — SODIUM CHLORIDE 0.9 % (FLUSH) 0.9 %
5-40 SYRINGE (ML) INJECTION PRN
Status: DISCONTINUED | OUTPATIENT
Start: 2022-02-16 | End: 2022-02-16 | Stop reason: HOSPADM

## 2022-02-16 RX ORDER — SUCCINYLCHOLINE CHLORIDE 20 MG/ML
INJECTION INTRAMUSCULAR; INTRAVENOUS PRN
Status: DISCONTINUED | OUTPATIENT
Start: 2022-02-16 | End: 2022-02-16 | Stop reason: SDUPTHER

## 2022-02-16 RX ORDER — MAGNESIUM HYDROXIDE 1200 MG/15ML
LIQUID ORAL CONTINUOUS PRN
Status: COMPLETED | OUTPATIENT
Start: 2022-02-16 | End: 2022-02-16

## 2022-02-16 RX ORDER — FENTANYL CITRATE 50 UG/ML
INJECTION, SOLUTION INTRAMUSCULAR; INTRAVENOUS PRN
Status: DISCONTINUED | OUTPATIENT
Start: 2022-02-16 | End: 2022-02-16

## 2022-02-16 RX ORDER — OXYCODONE HYDROCHLORIDE 5 MG/1
5 TABLET ORAL
Status: COMPLETED | OUTPATIENT
Start: 2022-02-16 | End: 2022-02-16

## 2022-02-16 RX ORDER — MIDAZOLAM HYDROCHLORIDE 1 MG/ML
INJECTION INTRAMUSCULAR; INTRAVENOUS PRN
Status: DISCONTINUED | OUTPATIENT
Start: 2022-02-16 | End: 2022-02-16 | Stop reason: SDUPTHER

## 2022-02-16 RX ORDER — SODIUM CHLORIDE, SODIUM LACTATE, POTASSIUM CHLORIDE, CALCIUM CHLORIDE 600; 310; 30; 20 MG/100ML; MG/100ML; MG/100ML; MG/100ML
INJECTION, SOLUTION INTRAVENOUS CONTINUOUS
Status: DISCONTINUED | OUTPATIENT
Start: 2022-02-16 | End: 2022-02-16 | Stop reason: HOSPADM

## 2022-02-16 RX ORDER — FENTANYL CITRATE 50 UG/ML
INJECTION, SOLUTION INTRAMUSCULAR; INTRAVENOUS PRN
Status: DISCONTINUED | OUTPATIENT
Start: 2022-02-16 | End: 2022-02-16 | Stop reason: SDUPTHER

## 2022-02-16 RX ORDER — EPHEDRINE SULFATE 50 MG/ML
INJECTION INTRAVENOUS PRN
Status: DISCONTINUED | OUTPATIENT
Start: 2022-02-16 | End: 2022-02-16 | Stop reason: SDUPTHER

## 2022-02-16 RX ORDER — OXYCODONE HYDROCHLORIDE 5 MG/1
5 TABLET ORAL EVERY 6 HOURS PRN
Qty: 12 TABLET | Refills: 0 | Status: SHIPPED | OUTPATIENT
Start: 2022-02-16 | End: 2022-02-19

## 2022-02-16 RX ORDER — PROPOFOL 10 MG/ML
INJECTION, EMULSION INTRAVENOUS PRN
Status: DISCONTINUED | OUTPATIENT
Start: 2022-02-16 | End: 2022-02-16 | Stop reason: SDUPTHER

## 2022-02-16 RX ORDER — LIDOCAINE HYDROCHLORIDE 10 MG/ML
1 INJECTION, SOLUTION EPIDURAL; INFILTRATION; INTRACAUDAL; PERINEURAL
Status: DISCONTINUED | OUTPATIENT
Start: 2022-02-16 | End: 2022-02-16 | Stop reason: HOSPADM

## 2022-02-16 RX ORDER — ROCURONIUM BROMIDE 10 MG/ML
INJECTION, SOLUTION INTRAVENOUS PRN
Status: DISCONTINUED | OUTPATIENT
Start: 2022-02-16 | End: 2022-02-16 | Stop reason: SDUPTHER

## 2022-02-16 RX ORDER — MEPERIDINE HYDROCHLORIDE 25 MG/ML
12.5 INJECTION INTRAMUSCULAR; INTRAVENOUS; SUBCUTANEOUS EVERY 5 MIN PRN
Status: DISCONTINUED | OUTPATIENT
Start: 2022-02-16 | End: 2022-02-16 | Stop reason: HOSPADM

## 2022-02-16 RX ORDER — ONDANSETRON 2 MG/ML
INJECTION INTRAMUSCULAR; INTRAVENOUS PRN
Status: DISCONTINUED | OUTPATIENT
Start: 2022-02-16 | End: 2022-02-16 | Stop reason: SDUPTHER

## 2022-02-16 RX ORDER — SODIUM CHLORIDE 0.9 % (FLUSH) 0.9 %
5-40 SYRINGE (ML) INJECTION EVERY 12 HOURS SCHEDULED
Status: DISCONTINUED | OUTPATIENT
Start: 2022-02-16 | End: 2022-02-16 | Stop reason: HOSPADM

## 2022-02-16 RX ORDER — DEXAMETHASONE SODIUM PHOSPHATE 4 MG/ML
INJECTION, SOLUTION INTRA-ARTICULAR; INTRALESIONAL; INTRAMUSCULAR; INTRAVENOUS; SOFT TISSUE PRN
Status: DISCONTINUED | OUTPATIENT
Start: 2022-02-16 | End: 2022-02-16 | Stop reason: SDUPTHER

## 2022-02-16 RX ORDER — PROCHLORPERAZINE EDISYLATE 5 MG/ML
5 INJECTION INTRAMUSCULAR; INTRAVENOUS
Status: DISCONTINUED | OUTPATIENT
Start: 2022-02-16 | End: 2022-02-16 | Stop reason: HOSPADM

## 2022-02-16 RX ORDER — ONDANSETRON 2 MG/ML
4 INJECTION INTRAMUSCULAR; INTRAVENOUS
Status: DISCONTINUED | OUTPATIENT
Start: 2022-02-16 | End: 2022-02-16 | Stop reason: HOSPADM

## 2022-02-16 RX ORDER — FENTANYL CITRATE 50 UG/ML
25 INJECTION, SOLUTION INTRAMUSCULAR; INTRAVENOUS EVERY 5 MIN PRN
Status: DISCONTINUED | OUTPATIENT
Start: 2022-02-16 | End: 2022-02-16 | Stop reason: HOSPADM

## 2022-02-16 RX ORDER — SODIUM CHLORIDE 9 MG/ML
25 INJECTION, SOLUTION INTRAVENOUS PRN
Status: DISCONTINUED | OUTPATIENT
Start: 2022-02-16 | End: 2022-02-16 | Stop reason: HOSPADM

## 2022-02-16 RX ORDER — BUPIVACAINE HYDROCHLORIDE 5 MG/ML
INJECTION, SOLUTION EPIDURAL; INTRACAUDAL PRN
Status: DISCONTINUED | OUTPATIENT
Start: 2022-02-16 | End: 2022-02-16 | Stop reason: ALTCHOICE

## 2022-02-16 RX ORDER — METOPROLOL SUCCINATE 25 MG/1
12.5 TABLET, EXTENDED RELEASE ORAL ONCE
Status: COMPLETED | OUTPATIENT
Start: 2022-02-16 | End: 2022-02-16

## 2022-02-16 RX ORDER — GLYCOPYRROLATE 0.2 MG/ML
INJECTION INTRAMUSCULAR; INTRAVENOUS PRN
Status: DISCONTINUED | OUTPATIENT
Start: 2022-02-16 | End: 2022-02-16 | Stop reason: SDUPTHER

## 2022-02-16 RX ORDER — LIDOCAINE HYDROCHLORIDE 20 MG/ML
INJECTION, SOLUTION INFILTRATION; PERINEURAL PRN
Status: DISCONTINUED | OUTPATIENT
Start: 2022-02-16 | End: 2022-02-16 | Stop reason: SDUPTHER

## 2022-02-16 RX ADMIN — LIDOCAINE HYDROCHLORIDE 100 MG: 20 INJECTION, SOLUTION INFILTRATION; PERINEURAL at 10:27

## 2022-02-16 RX ADMIN — GLYCOPYRROLATE 0.2 MG: 0.2 INJECTION INTRAMUSCULAR; INTRAVENOUS at 10:39

## 2022-02-16 RX ADMIN — ROCURONIUM BROMIDE 10 MG: 10 INJECTION INTRAVENOUS at 10:27

## 2022-02-16 RX ADMIN — PHENYLEPHRINE HYDROCHLORIDE 200 MCG: 10 INJECTION, SOLUTION INTRAMUSCULAR; INTRAVENOUS; SUBCUTANEOUS at 10:46

## 2022-02-16 RX ADMIN — SUCCINYLCHOLINE CHLORIDE 200 MG: 20 INJECTION, SOLUTION INTRAMUSCULAR; INTRAVENOUS; PARENTERAL at 10:28

## 2022-02-16 RX ADMIN — GLYCOPYRROLATE 0.2 MG: 0.2 INJECTION INTRAMUSCULAR; INTRAVENOUS at 10:58

## 2022-02-16 RX ADMIN — PHENYLEPHRINE HYDROCHLORIDE 50 MCG: 10 INJECTION, SOLUTION INTRAMUSCULAR; INTRAVENOUS; SUBCUTANEOUS at 10:26

## 2022-02-16 RX ADMIN — PHENYLEPHRINE HYDROCHLORIDE 50 MCG/MIN: 10 INJECTION, SOLUTION INTRAMUSCULAR; INTRAVENOUS; SUBCUTANEOUS at 11:04

## 2022-02-16 RX ADMIN — PHENYLEPHRINE HYDROCHLORIDE 200 MCG: 10 INJECTION, SOLUTION INTRAMUSCULAR; INTRAVENOUS; SUBCUTANEOUS at 11:18

## 2022-02-16 RX ADMIN — METOPROLOL SUCCINATE 12.5 MG: 25 TABLET, EXTENDED RELEASE ORAL at 09:37

## 2022-02-16 RX ADMIN — EPHEDRINE SULFATE 5 MG: 50 INJECTION INTRAVENOUS at 11:01

## 2022-02-16 RX ADMIN — SUGAMMADEX 300 MG: 100 INJECTION, SOLUTION INTRAVENOUS at 11:42

## 2022-02-16 RX ADMIN — CEFAZOLIN SODIUM 2000 MG: 10 INJECTION, POWDER, FOR SOLUTION INTRAVENOUS at 10:23

## 2022-02-16 RX ADMIN — EPHEDRINE SULFATE 5 MG: 50 INJECTION INTRAVENOUS at 10:57

## 2022-02-16 RX ADMIN — OXYCODONE HYDROCHLORIDE 5 MG: 5 TABLET ORAL at 12:57

## 2022-02-16 RX ADMIN — PHENYLEPHRINE HYDROCHLORIDE 100 MCG: 10 INJECTION, SOLUTION INTRAMUSCULAR; INTRAVENOUS; SUBCUTANEOUS at 10:38

## 2022-02-16 RX ADMIN — PHENYLEPHRINE HYDROCHLORIDE 200 MCG: 10 INJECTION, SOLUTION INTRAMUSCULAR; INTRAVENOUS; SUBCUTANEOUS at 10:54

## 2022-02-16 RX ADMIN — GLYCOPYRROLATE 0.4 MG: 0.2 INJECTION INTRAMUSCULAR; INTRAVENOUS at 10:23

## 2022-02-16 RX ADMIN — ONDANSETRON 4 MG: 2 INJECTION INTRAMUSCULAR; INTRAVENOUS at 10:38

## 2022-02-16 RX ADMIN — EPHEDRINE SULFATE 5 MG: 50 INJECTION INTRAVENOUS at 11:05

## 2022-02-16 RX ADMIN — HYDROMORPHONE HYDROCHLORIDE 0.6 MG: 1 INJECTION, SOLUTION INTRAMUSCULAR; INTRAVENOUS; SUBCUTANEOUS at 10:40

## 2022-02-16 RX ADMIN — FENTANYL CITRATE 50 MCG: 50 INJECTION, SOLUTION INTRAMUSCULAR; INTRAVENOUS at 10:26

## 2022-02-16 RX ADMIN — HYDROMORPHONE HYDROCHLORIDE 0.5 MG: 1 INJECTION, SOLUTION INTRAMUSCULAR; INTRAVENOUS; SUBCUTANEOUS at 12:56

## 2022-02-16 RX ADMIN — PHENYLEPHRINE HYDROCHLORIDE 100 MCG: 10 INJECTION, SOLUTION INTRAMUSCULAR; INTRAVENOUS; SUBCUTANEOUS at 10:34

## 2022-02-16 RX ADMIN — EPHEDRINE SULFATE 5 MG: 50 INJECTION INTRAVENOUS at 11:09

## 2022-02-16 RX ADMIN — PHENYLEPHRINE HYDROCHLORIDE 200 MCG: 10 INJECTION, SOLUTION INTRAMUSCULAR; INTRAVENOUS; SUBCUTANEOUS at 11:06

## 2022-02-16 RX ADMIN — EPHEDRINE SULFATE 5 MG: 50 INJECTION INTRAVENOUS at 10:39

## 2022-02-16 RX ADMIN — HYDROMORPHONE HYDROCHLORIDE 0.4 MG: 1 INJECTION, SOLUTION INTRAMUSCULAR; INTRAVENOUS; SUBCUTANEOUS at 10:55

## 2022-02-16 RX ADMIN — PHENYLEPHRINE HYDROCHLORIDE 200 MCG: 10 INJECTION, SOLUTION INTRAMUSCULAR; INTRAVENOUS; SUBCUTANEOUS at 11:02

## 2022-02-16 RX ADMIN — MIDAZOLAM HYDROCHLORIDE 2 MG: 2 INJECTION, SOLUTION INTRAMUSCULAR; INTRAVENOUS at 10:23

## 2022-02-16 RX ADMIN — DEXAMETHASONE SODIUM PHOSPHATE 4 MG: 4 INJECTION, SOLUTION INTRAMUSCULAR; INTRAVENOUS at 10:34

## 2022-02-16 RX ADMIN — FAMOTIDINE 20 MG: 10 INJECTION, SOLUTION INTRAVENOUS at 10:05

## 2022-02-16 RX ADMIN — GLYCOPYRROLATE 0.2 MG: 0.2 INJECTION INTRAMUSCULAR; INTRAVENOUS at 10:44

## 2022-02-16 RX ADMIN — ROCURONIUM BROMIDE 10 MG: 10 INJECTION INTRAVENOUS at 11:30

## 2022-02-16 RX ADMIN — EPHEDRINE SULFATE 10 MG: 50 INJECTION INTRAVENOUS at 10:49

## 2022-02-16 RX ADMIN — PROPOFOL 200 MG: 10 INJECTION, EMULSION INTRAVENOUS at 10:28

## 2022-02-16 RX ADMIN — PHENYLEPHRINE HYDROCHLORIDE 200 MCG: 10 INJECTION, SOLUTION INTRAMUSCULAR; INTRAVENOUS; SUBCUTANEOUS at 10:42

## 2022-02-16 RX ADMIN — EPHEDRINE SULFATE 10 MG: 50 INJECTION INTRAVENOUS at 10:53

## 2022-02-16 RX ADMIN — HYDROMORPHONE HYDROCHLORIDE 0.5 MG: 1 INJECTION, SOLUTION INTRAMUSCULAR; INTRAVENOUS; SUBCUTANEOUS at 13:06

## 2022-02-16 RX ADMIN — SODIUM CHLORIDE, POTASSIUM CHLORIDE, SODIUM LACTATE AND CALCIUM CHLORIDE: 600; 310; 30; 20 INJECTION, SOLUTION INTRAVENOUS at 09:22

## 2022-02-16 RX ADMIN — EPHEDRINE SULFATE 5 MG: 50 INJECTION INTRAVENOUS at 10:45

## 2022-02-16 RX ADMIN — PHENYLEPHRINE HYDROCHLORIDE 200 MCG: 10 INJECTION, SOLUTION INTRAMUSCULAR; INTRAVENOUS; SUBCUTANEOUS at 10:50

## 2022-02-16 RX ADMIN — ROCURONIUM BROMIDE 40 MG: 10 INJECTION INTRAVENOUS at 10:38

## 2022-02-16 RX ADMIN — FENTANYL CITRATE 50 MCG: 50 INJECTION, SOLUTION INTRAMUSCULAR; INTRAVENOUS at 10:39

## 2022-02-16 RX ADMIN — PHENYLEPHRINE HYDROCHLORIDE 200 MCG: 10 INJECTION, SOLUTION INTRAMUSCULAR; INTRAVENOUS; SUBCUTANEOUS at 11:10

## 2022-02-16 RX ADMIN — PHENYLEPHRINE HYDROCHLORIDE 200 MCG: 10 INJECTION, SOLUTION INTRAMUSCULAR; INTRAVENOUS; SUBCUTANEOUS at 10:58

## 2022-02-16 ASSESSMENT — PULMONARY FUNCTION TESTS
PIF_VALUE: 1
PIF_VALUE: 34
PIF_VALUE: 35
PIF_VALUE: 34
PIF_VALUE: 7
PIF_VALUE: 25
PIF_VALUE: 16
PIF_VALUE: 23
PIF_VALUE: 22
PIF_VALUE: 23
PIF_VALUE: 26
PIF_VALUE: 35
PIF_VALUE: 2
PIF_VALUE: 35
PIF_VALUE: 35
PIF_VALUE: 28
PIF_VALUE: 15
PIF_VALUE: 2
PIF_VALUE: 35
PIF_VALUE: 0
PIF_VALUE: 35
PIF_VALUE: 35
PIF_VALUE: 3
PIF_VALUE: 33
PIF_VALUE: 21
PIF_VALUE: 22
PIF_VALUE: 24
PIF_VALUE: 23
PIF_VALUE: 28
PIF_VALUE: 35
PIF_VALUE: 37
PIF_VALUE: 35
PIF_VALUE: 25
PIF_VALUE: 23
PIF_VALUE: 21
PIF_VALUE: 1
PIF_VALUE: 24
PIF_VALUE: 35
PIF_VALUE: 26
PIF_VALUE: 38
PIF_VALUE: 2
PIF_VALUE: 17
PIF_VALUE: 21
PIF_VALUE: 35
PIF_VALUE: 34
PIF_VALUE: 22
PIF_VALUE: 36
PIF_VALUE: 33
PIF_VALUE: 36
PIF_VALUE: 24
PIF_VALUE: 3
PIF_VALUE: 22
PIF_VALUE: 33
PIF_VALUE: 24
PIF_VALUE: 34
PIF_VALUE: 1
PIF_VALUE: 35
PIF_VALUE: 1
PIF_VALUE: 22
PIF_VALUE: 21
PIF_VALUE: 33
PIF_VALUE: 35
PIF_VALUE: 35
PIF_VALUE: 42
PIF_VALUE: 34
PIF_VALUE: 0
PIF_VALUE: 32
PIF_VALUE: 35
PIF_VALUE: 26
PIF_VALUE: 22
PIF_VALUE: 1
PIF_VALUE: 1
PIF_VALUE: 22
PIF_VALUE: 35
PIF_VALUE: 32
PIF_VALUE: 16
PIF_VALUE: 35
PIF_VALUE: 34
PIF_VALUE: 35
PIF_VALUE: 33
PIF_VALUE: 35
PIF_VALUE: 3
PIF_VALUE: 33
PIF_VALUE: 0
PIF_VALUE: 22
PIF_VALUE: 33
PIF_VALUE: 0
PIF_VALUE: 26
PIF_VALUE: 33

## 2022-02-16 ASSESSMENT — PAIN DESCRIPTION - DESCRIPTORS
DESCRIPTORS: ACHING;BURNING;CRAMPING
DESCRIPTORS: SORE
DESCRIPTORS: ACHING;PRESSURE;OTHER (COMMENT)
DESCRIPTORS: SORE
DESCRIPTORS: SORE

## 2022-02-16 ASSESSMENT — PAIN DESCRIPTION - LOCATION
LOCATION: ABDOMEN

## 2022-02-16 ASSESSMENT — PAIN DESCRIPTION - ORIENTATION
ORIENTATION: MID;LOWER
ORIENTATION: ANTERIOR

## 2022-02-16 ASSESSMENT — PAIN DESCRIPTION - FREQUENCY
FREQUENCY: CONTINUOUS

## 2022-02-16 ASSESSMENT — PAIN SCALES - GENERAL
PAINLEVEL_OUTOF10: 7
PAINLEVEL_OUTOF10: 0
PAINLEVEL_OUTOF10: 5
PAINLEVEL_OUTOF10: 6

## 2022-02-16 ASSESSMENT — PAIN DESCRIPTION - PAIN TYPE
TYPE: SURGICAL PAIN

## 2022-02-16 ASSESSMENT — PAIN - FUNCTIONAL ASSESSMENT: PAIN_FUNCTIONAL_ASSESSMENT: 0-10

## 2022-02-16 NOTE — H&P
[facility]    [phone]    [facility] [employer] Same Day Surgery Update H & P  Department of General Surgery   Surgical Service   Pre-operative History and Physical  Last H & P within the last 30 days. DIAGNOSIS:   Left inguinal hernia [K40.90]    Procedure(s):  LAPAROSCOPIC LEFT INGUINAL HERNIA REPAIR    History obtained from: Patient interview and EHR      HISTORY OF PRESENT ILLNESS:   Patient is a 66 y/o male with c/o left groin pain, associated with an inguinal hernia. Given the increased pain with activities, the patient presents for the above procedure. Covid 19:  Patient denies fever, chills, worsening cough, or known exposure to Covid-19. Past Medical History:        Diagnosis Date    CAD (coronary artery disease)     Hyperlipidemia     Inguinal hernia     Knee osteoarthritis     Pre-diabetes     STEMI (ST elevation myocardial infarction) ([facility] [address] 05/2019    inferior STEMI; subtotal RCA stented with 4 x 23 mm BMS. EF 20-25%, repeat 7/2019=55%     Past Surgical History:        Procedure Laterality Date    COLONOSCOPY  2012    ?  CORONARY ANGIOPLASTY WITH STENT PLACEMENT  2019    HAND SURGERY Left     in his 35s    SHOULDER SURGERY Left     in his 35s     Past Social History:  Social History     Socioeconomic History    Marital status: Single     Spouse name: None    Number of children: 11    Years of education: None    Highest education level: None   Occupational History    Occupation: maintenance   Tobacco Use    Smoking status: Never Smoker    Smokeless tobacco: Current User     Types: Chew   Vaping Use    Vaping Use: Never used   Substance and Sexual Activity    Alcohol use: Not Currently    Drug use: Yes     Types:  Other-see comments, Cocaine     Comment: NOT USED IN 3-4 YEARS     Sexual activity: None   Other Topics Concern    None   Social History Narrative    Lives with roommate     Social Determinants of Health     Financial Resource Strain: Low Risk     Difficulty of Paying Living Expenses: Not very hard   Food Insecurity: No Food Insecurity    Worried About Running Out of Food in the Last Year: Never true    Ran Out of Food in the Last Year: Never true   Transportation Needs:     Lack of Transportation (Medical): Not on file    Lack of Transportation (Non-Medical): Not on file   Physical Activity:     Days of Exercise per Week: Not on file    Minutes of Exercise per Session: Not on file   Stress:     Feeling of Stress : Not on file   Social Connections:     Frequency of Communication with Friends and Family: Not on file    Frequency of Social Gatherings with Friends and Family: Not on file    Attends Temple Services: Not on file    Active Member of [address] [place] [address] [employer] or Organizations: Not on file    Attends Club or Organization Meetings: Not on file    Marital Status: Not on file   Intimate Partner Violence:     Fear of Current or Ex-Partner: Not on file    Emotionally Abused: Not on file    Physically Abused: Not on file    Sexually Abused: Not on file   Housing Stability:     Unable to Pay for Housing in the Last Year: Not on file    Number of [place] in the Last Year: Not on file    Unstable Housing in the Last Year: Not on file         Medications Prior to Admission:      Prior to Admission medications    Medication Sig Start Date End Date Taking? Authorizing Provider   aspirin EC 81 MG EC tablet Take 1 tablet by mouth daily 11/12/21  Yes Sriram Monk MD   atorvastatin (LIPITOR) 40 MG tablet Take 1 tablet by mouth daily 11/12/21  Yes Sriram Monk MD   metoprolol succinate (TOPROL XL) 25 MG extended release tablet Take 0.5 tablets by mouth daily 11/12/21  Yes Sriram Monk MD   meloxicam (MOBIC) 15 MG tablet Take 15 mg by mouth daily 9/7/21   Historical Provider, MD         Allergies:  Patient has no known allergies.     PHYSICAL EXAM:      /88   Pulse 71   Temp 98.2 °F (36.8 °C) (Temporal)   Resp 16   Ht 5' 10\" (1.778 m)   Wt 215 lb (97.5 kg)   SpO2 98%   BMI 30.85 kg/m²      Airway:  Airway patent with no audible stridor    Heart:  Regular rate and rhythm, No murmur noted    Lungs:  No increased work of breathing, good air exchange, clear to auscultation bilaterally, no crackles or wheezing    Abdomen:  Soft, non-distended, non-tender, no rebound tenderness or guarding, and no masses palpated    ASSESSMENT AND PLAN     Patient is a 65 y.o. male with above specified procedure planned. 1.  The patients history and physical was obtained and signed off by the pre-admission testing department. Patient seen and focused exam done today- no new changes since last physical exam on 2/4/2022.    2.  Access to ancillary services are available per request of the provider.     ALDEN Concepcion - CNP     2/16/2022

## 2022-02-16 NOTE — ANESTHESIA POSTPROCEDURE EVALUATION
Department of Anesthesiology  Postprocedure Note    Patient: Bi Carter  MRN: [id number]  YOB: 1960  Date of evaluation: 2/16/2022  Time:  1:24 PM     Procedure Summary     Date: 02/16/22 Room / Location: [address] [place] [address]    Anesthesia Start: 1024 Anesthesia Stop: 1158    Procedure: LAPAROSCOPIC LEFT INGUINAL HERNIA REPAIR (Left ) Diagnosis:       Left inguinal hernia      (Left inguinal hernia [K40.90])    Surgeons: Harlan Sewell MD Responsible Provider:     Anesthesia Type: general ASA Status: 3          Anesthesia Type: general    Mary Phase I: Mary Score: 4    Mary Phase II:      Last vitals: Reviewed and per EMR flowsheets.        Anesthesia Post Evaluation    Patient location during evaluation: PACU  Patient participation: complete - patient participated  Level of consciousness: awake and alert  Airway patency: patent  Nausea & Vomiting: no nausea and no vomiting  Complications: no  Cardiovascular status: hemodynamically stable  Respiratory status: acceptable  Hydration status: euvolemic 5-Fu Counseling: 5-Fluorouracil Counseling:  I discussed with the patient the risks of 5-fluorouracil including but not limited to erythema, scaling, itching, weeping, crusting, and pain.

## 2022-02-16 NOTE — ANESTHESIA PRE PROCEDURE
Department of Anesthesiology  Preprocedure Note       Name:  Roseanne Metz   Age:  65 y.o.  :  1960                                          MRN:  [id number]         Date:  2022      Surgeon: Carlton Horowitz):  Anderson Rizvi MD    Procedure: Procedure(s):  LAPAROSCOPIC LEFT INGUINAL HERNIA REPAIR    Medications prior to admission:   Prior to Admission medications    Medication Sig Start Date End Date Taking?  Authorizing Provider   aspirin EC 81 MG EC tablet Take 1 tablet by mouth daily 21  Yes Suzanne Arnold MD   atorvastatin (LIPITOR) 40 MG tablet Take 1 tablet by mouth daily 21  Yes Suzanne Arnold MD   metoprolol succinate (TOPROL XL) 25 MG extended release tablet Take 0.5 tablets by mouth daily 21  Yes Suzanne Arnold MD   meloxicam (MOBIC) 15 MG tablet Take 15 mg by mouth daily 21   Historical Provider, MD       Current medications:    Current Facility-Administered Medications   Medication Dose Route Frequency Provider Last Rate Last Admin    ceFAZolin (ANCEF) 2000 mg in dextrose 5 % 50 mL IVPB  2,000 mg IntraVENous Once Anderson Rizvi MD        lactated ringers infusion   IntraVENous Continuous Brianna Berrios  mL/hr at 22 0922 New Bag at 22 8895    sodium chloride flush 0.9 % injection 5-40 mL  5-40 mL IntraVENous 2 times per day Brianna Berrios MD        sodium chloride flush 0.9 % injection 5-40 mL  5-40 mL IntraVENous PRN Brianna Berrios MD        0.9 % sodium chloride infusion  25 mL IntraVENous PRN Brianna Berrios MD        lidocaine PF 1 % injection 1 mL  1 mL IntraDERmal Once PRN Brianna Berrios MD           Allergies:  No Known Allergies    Problem List:    Patient Active Problem List   Diagnosis Code    STEMI (ST elevation myocardial infarction) ([facility] I21.3    Post PTCA Z98.61    [facility] I42.0       Past Medical History:        Diagnosis Date    CAD (coronary artery disease)     Hyperlipidemia     Inguinal hernia     Knee osteoarthritis     Pre-diabetes     STEMI (ST elevation myocardial infarction) ([facility] 05/2019    inferior STEMI; subtotal RCA stented with 4 x 23 mm BMS. EF 20-25%, repeat 7/2019=55%       Past Surgical History:        Procedure Laterality Date    COLONOSCOPY  2012    ?  CORONARY ANGIOPLASTY WITH STENT PLACEMENT  2019    HAND SURGERY Left     in his 35s    SHOULDER SURGERY Left     in his 35s       Social History:    Social History     Tobacco Use    Smoking status: Never Smoker    Smokeless tobacco: Current User     Types: Chew   Substance Use Topics    Alcohol use: Not Currently                                Ready to quit: Not Answered  Counseling given: Not Answered      Vital Signs (Current):   Vitals:    02/09/22 0902 02/16/22 0858   BP:  114/88   Pulse:  71   Resp:  16   Temp:  98.2 °F (36.8 °C)   TempSrc:  Temporal   SpO2:  98%   Weight: 219 lb (99.3 kg) 215 lb (97.5 kg)   Height: 5' 10\" (1.778 m) 5' 10\" (1.778 m)                                              BP Readings from Last 3 Encounters:   02/16/22 114/88   02/04/22 122/86   01/07/22 (!) 139/95       NPO Status: Time of last liquid consumption: 2330                        Time of last solid consumption: 2330                        Date of last liquid consumption: 02/15/22                        Date of last solid food consumption: 02/15/22    BMI:   Wt Readings from Last 3 Encounters:   02/16/22 215 lb (97.5 kg)   02/04/22 219 lb (99.3 kg)   01/07/22 216 lb 12.8 oz (98.3 kg)     Body mass index is 30.85 kg/m².     CBC:   Lab Results   Component Value Date    WBC 7.8 05/17/2019    RBC 4.29 05/17/2019    HGB 12.6 05/17/2019    HCT 38.4 05/17/2019    MCV 89.4 05/17/2019    RDW 13.6 05/17/2019     05/17/2019       CMP:   Lab Results   Component Value Date     05/17/2019    K 3.8 05/17/2019     05/17/2019    CO2 27 05/17/2019    BUN 14 05/17/2019    CREATININE 0.9 05/17/2019    GFRAA >60 05/17/2019    LABGLOM >60 05/17/2019    GLUCOSE 97 05/17/2019    PROT 6.9 07/23/2019    CALCIUM 8.7 05/17/2019    BILITOT 0.3 07/23/2019    ALKPHOS 82 07/23/2019    AST 17 07/23/2019    ALT 18 07/23/2019       POC Tests: No results for input(s): POCGLU, POCNA, POCK, POCCL, POCBUN, POCHEMO, POCHCT in the last 72 hours. Coags: No results found for: PROTIME, INR, APTT    HCG (If Applicable): No results found for: PREGTESTUR, PREGSERUM, HCG, HCGQUANT     ABGs: No results found for: PHART, PO2ART, BKQ3FDB, KZX2EDX, BEART, B0RONCMM     Type & Screen (If Applicable):  No results found for: LABABO, LABRH    Drug/Infectious Status (If Applicable):  No results found for: HIV, HEPCAB    COVID-19 Screening (If Applicable): No results found for: COVID19        Anesthesia Evaluation  Patient summary reviewed and Nursing notes reviewed no history of anesthetic complications:   Airway: Mallampati: II  TM distance: >3 FB   Neck ROM: full  Mouth opening: > = 3 FB Dental:      Comment: Poor dentition cracked missing chipped    Pulmonary:Negative Pulmonary ROS and normal exam                               Cardiovascular:    (+) past MI:, CAD:, CABG/stent:,                   Neuro/Psych:   Negative Neuro/Psych ROS              GI/Hepatic/Renal: Neg GI/Hepatic/Renal ROS            Endo/Other: Negative Endo/Other ROS                    Abdominal:             Vascular: negative vascular ROS. Other Findings:             Anesthesia Plan      general     ASA 3       Induction: intravenous. MIPS: Postoperative opioids intended. Anesthetic plan and risks discussed with patient. Plan discussed with CRNA.     Attending anesthesiologist reviewed and agrees with Preprocedure content              Patience MD Darryl   2/16/2022

## 2022-02-16 NOTE — PROGRESS NOTES
Pt admitted to pacu s/p LAPAROSCOPIC LEFT INGUINAL HERNIA REPAIR - not responding at present,  PO 95% w oral airway  3L Abd w 3 lap sites CDI w surgi glue

## 2022-02-16 NOTE — PROGRESS NOTES
Ambulatory Surgery/Procedure Discharge Note    Vitals:    02/16/22 1352   BP: 125/81   Pulse: 74   Resp: 18   Temp: 96.8 °F (36 °C)   SpO2: 96%       In: 3050 [P.O.:1450; I.V.:1600]  Out: 380 [Urine:350]    Restroom use offered before discharge. Yes    Pain assessment:  level of pain (1-10, 10 severe),   Pain Level: 5     Abd inc well approx with surgical glue D&I with abd binder and ice pack in place. Patient discharged to home/self care. Patient discharged via wheel chair by transporter to waiting family/S.O.       2/16/2022 5:41 PM    1652 After pt voids 100mL clear yellow/straw urine, bladder scanned showing 653mL. Pt states 'feels like he has to urinate again' and at 1715 pt voids 250mL of clear yellow/straw urine. Bladder not palpable and abd soft and pt denies any distress. 1610 Pt up in room and walking in [place] with daughter and call light in reach. Will monitor. Pt states 'denies need to void' at this time and tolerating po well. [facility] 860 instructions given to pt and daughter reinforcing pt must void prior to being discharged with verbalization of understanding of pt and duaghter with abd incisions well approx with surgical glue dry and intact. Pt sitting up in chair while eating pudding and jello and drinking coffee and water. 1355 VSS and abdominal incisions well approx with surgical glue D&I with ice pack in place with pt tolerating po. Encouraging po intake and pt verbalization of understanding that pt must void prior to discharge to home. Call light in reach. Will monitor.

## 2022-02-16 NOTE — BRIEF OP NOTE
Brief Postoperative Note      Patient: Anna Cordova  YOB: 1960  MRN: [id number]    Date of Procedure: 2/16/2022    Pre-Op Diagnosis: Left inguinal hernia [K40.90]    Post-Op Diagnosis: Same       Procedure(s):  LAPAROSCOPIC LEFT INGUINAL HERNIA REPAIR    Surgeon(s):  Silverio Fowler MD    Assistant:  Resident: Jessica Hawk MD    Anesthesia: General    Estimated Blood Loss (mL): Minimal    Complications: None    Findings:   Laparoscopic left inguinal hernia repair with mesh. Plan:  Discharge home from [facility] after demonstrating the ability to void independently.      Electronically signed by Jessica Hawk MD on 2/16/2022 at 11:48 AM

## 2022-02-17 NOTE — OP NOTE
Operative Note      Patient: Chris Friedman  YOB: 1960  MRN: [id number]    Date of Procedure: 2/16/2022    Pre-Op Diagnosis: Left inguinal hernia [K40.90]    Post-Op Diagnosis: Same       Procedure(s):  LAPAROSCOPIC LEFT INGUINAL HERNIA REPAIR    Surgeon(s):  Serenity Gregory MD    Assistant:   Resident: Howie Raymond MD    Anesthesia: General    Estimated Blood Loss (mL): Minimal    Complications: None    Implants:  Implant Name Type Inv. Item Serial No.  Lot No. LRB No. Used Action   MESH KIMBERLI L [id number] L INGUINAL WHT POLYPR MFIL - [id number]  MESH KIMBERLI L [id number] L INGUINAL WHT POLYPR MFIL  [facility] [address] [id number] Left 1 Implanted   SYSTEM PERM FIX L37CM 15 FAST CAPSUR - [id number]  SYSTEM PERM FIX L37CM 15 FAST CAPSUR  BARD DAVOL-WD [id number] Left 1 Implanted     Indication:  Angeles Wolfe is a 65year old male with a symptomatic left inguinal hernia who presents for elective repair. Findings:   Indirect left inguinal hernia. Detailed Description of Procedure:   Patient was brought to the operating room after informed consent was obtained and placed on the operating room table in supine position with both arms tucked. A state of general anesthesia was induced. Patient was prepped and draped in standard sterile fashion. Time out procedure was performed per institution protocol. After injecting local anesthetic a dennys-umbilical incision was made with 11 blade. The incision was deepened with S retractors and hemostat to bluntly dissect down to the anterior rectus sheath. This layer was entered with a stab incision. A hemostat was introduced into the opening and blunt dissection was used to identify the posterior rectus sheath. A 10 mm trochar was introduced into the space followed by a 10 mm angled laparoscope. The scope was used to dissect loose areolar tissue in the midline until the pubic symphysis was identified.   Two additional 5 mm trocars were inserted in the midline. The first one was placed between the pubic symphyses and umbilicus. The last one was placed just above the pubic symphysis. The Harish's ligament was dissected laterally until the crossing vein was identified. The preperitoneal space was further developed by exposing inferior epigastric vessels. The dissection was continued inferiorly and laterally, gently  peritoneum and pushing it back. Care was taken to avoid injury to neurovascular structures; however, during dissection the peritoneum was entered. This defect was repaired with two endoloop sutures. After the dissection was completed a large 3DMaxx mesh was introduced in the preperitoneal space and then secured in place using several tacks placed in Coopers ligament, just above the pubis, and then along the anterior abdominal wall. This allowed for a flat lay of the mesh, covering all potential areas of weakness. After ensuring ideal positioning of the mesh, the preperitoneal space was desufflated with good visualization of the trocars being removed during this process. The pneumoperitoneum was released through the existing port incisions. The periumbilical fascial defect was closed with a 0 Vicryl figure of eight stitch. Wounds were irrigated with sterile saline and hemostasis was ensured with electrocautery. The skin was closed with interrupted deep dermal stitches using 4-0 Monocryl. Skin glue was used to dress incisions. Dr. Harvey Tang was present and scrubbed for the entirety of the case and directed its course from start to finish.     Electronically signed by Michelle Schmitz MD on 2/17/2022 at 7:13 AM

## 2022-02-25 ENCOUNTER — OFFICE VISIT (OUTPATIENT)
Dept: SURGERY | Age: 62
End: 2022-02-25

## 2022-02-25 VITALS
SYSTOLIC BLOOD PRESSURE: 142 MMHG | HEART RATE: 77 BPM | DIASTOLIC BLOOD PRESSURE: 4 MMHG | HEIGHT: 70 IN | TEMPERATURE: 98.2 F | BODY MASS INDEX: 30.35 KG/M2 | WEIGHT: 212 LBS

## 2022-02-25 DIAGNOSIS — Z09 POSTOP CHECK: Primary | ICD-10-CM

## 2022-02-25 PROCEDURE — 99024 POSTOP FOLLOW-UP VISIT: CPT | Performed by: SURGERY

## 2022-02-25 NOTE — PROGRESS NOTES
PATIENT NAME: Andrea Plate OF BIRTH: 1960     TODAY'S DATE: 2/25/2022    Reason for Visit:  Post-operative evaluation     Requesting Physician:  Saulo Kinney MD    HISTORY OF PRESENT ILLNESS:  Emery Mejia is a 65 y.o. male with a PMHx as delineated below who presents for post-operative evaluation after undergoing laparoscopic left inguinal hernia repair on 2/9/2022. The patient reports that he continues to have mild post-operative pain that is progressively improving. He has no complaints regarding the surgical incisions. He is tolerating a diet well and passing regular bowel movements. REVIEW OF SYSTEMS:  CONSTITUTIONAL:  negative  HEENT:  negative  RESPIRATORY:  negative  CARDIOVASCULAR:  negative  GASTROINTESTINAL:  negative  GENITOURINARY:  negative  HEMATOLOGIC/LYMPHATIC:  negative  MUSCULOSKELETAL: negative  NEUROLOGICAL:  negative    PMH  Past Medical History:   Diagnosis Date    CAD (coronary artery disease)     Hyperlipidemia     Inguinal hernia     Knee osteoarthritis     Pre-diabetes     STEMI (ST elevation myocardial infarction) ([facility] 05/2019    inferior STEMI; subtotal RCA stented with 4 x 23 mm BMS. EF 20-25%, repeat 7/2019=55%     PSH  Past Surgical History:   Procedure Laterality Date    COLONOSCOPY  2012    ?     CORONARY ANGIOPLASTY WITH STENT PLACEMENT  2019    HAND SURGERY Left     in his 35s    HERNIA REPAIR Left 2/16/2022    LAPAROSCOPIC LEFT INGUINAL HERNIA REPAIR performed by Kaylie Moreno MD at [address] [place] [address]  02/16/2022    SHOULDER SURGERY Left     in his 35s     Social History  Social History     Socioeconomic History    Marital status: Single     Spouse name: Not on file    Number of children: 11    Years of education: Not on file    Highest education level: Not on file   Occupational History    Occupation: maintenance   Tobacco Use    Smoking status: Never Smoker    Smokeless tobacco: Current User     Types: Chew Vaping Use    Vaping Use: Never used   Substance and Sexual Activity    Alcohol use: Not Currently    Drug use: Yes     Types: Other-see comments, Cocaine     Comment: NOT USED IN 3-4 YEARS     Sexual activity: Not on file   Other Topics Concern    Not on file   Social History Narrative    Lives with roommate     Social Determinants of Health     Financial Resource Strain: Low Risk     Difficulty of Paying Living Expenses: Not very hard   Food Insecurity: No Food Insecurity    Worried About Running Out of Food in the Last Year: Never true    Christina of Food in the Last Year: Never true   Transportation Needs:     Lack of Transportation (Medical): Not on file    Lack of Transportation (Non-Medical): Not on file   Physical Activity:     Days of Exercise per Week: Not on file    Minutes of Exercise per Session: Not on file   Stress:     Feeling of Stress : Not on file   Social Connections:     Frequency of Communication with Friends and Family: Not on file    Frequency of Social Gatherings with Friends and Family: Not on file    Attends Confucianist Services: Not on file    Active Member of [address] [place] [address] or Organizations: Not on file    Attends Club or Organization Meetings: Not on file    Marital Status: Not on file   Intimate Partner Violence:     Fear of Current or Ex-Partner: Not on file    Emotionally Abused: Not on file    Physically Abused: Not on file    Sexually Abused: Not on file   Housing Stability:     Unable to Pay for Housing in the Last Year: Not on file    Number of [place] in the Last Year: Not on file    Unstable Housing in the Last Year: Not on file     Family History:   No family history on file.     Allergy: No Known Allergies    PHYSICAL EXAM:  VITALS:  BP (!) 142/4   Pulse 77   Temp 98.2 °F (36.8 °C)   Ht 5' 10\" (1.778 m)   Wt 212 lb (96.2 kg)   BMI 30.42 kg/m²     General appearance: alert; no acute distress; grooming appropriate  HEENT: Normocephalic/atraumatic; PERRL; no scleral icterus; trachea midline; no JVD; no lymphadenopathy  Chest/Lungs: Normal effort; breathing room air  Cardiovascular: Regular rate and rhythm  Abdomen: Non-distended; soft; non-tender; incisions are well-approximated. Resolving ecchymosis in the suprapubic region. Skin: warm and dry; no exanthems  Extremities: no edema; no cyanosis  Neuro: A&Ox3; no focal deficits; sensation intact    IMPRESSION/RECOMMENDATIONS:  Pollo Cartagena is a 65 y.o. male who presents for post-operative evaluation after undergoing laparoscopic left inguinal hernia repair on 2/9/2022. He is recovering well post-operatively with no post-operative concerns. - Follow-up in the clinic on an as-needed basis    Eboni Emmanuel MD MPH  General Surgery PGY3  2/25/2022     I have seen, examined, and reviewed the patients chart. I agree with the residents assessment and have made appropriate changes.     Louie Ariza

## 2022-03-31 ENCOUNTER — TELEPHONE (OUTPATIENT)
Dept: INTERNAL MEDICINE CLINIC | Age: 62
End: 2022-03-31

## 2022-03-31 DIAGNOSIS — L98.9 NON-HEALING SKIN LESION: Primary | ICD-10-CM

## 2022-03-31 NOTE — TELEPHONE ENCOUNTER
This is the info I previously gave him:  [phone]  [facility]     Dr Rolando Sandifer,  Dermatology  [phone]  Call me if cant get in

## 2022-03-31 NOTE — TELEPHONE ENCOUNTER
LVM for pt to call office regarding the numbers to the Dermatology at [facility] and [facility]

## 2022-03-31 NOTE — TELEPHONE ENCOUNTER
----- Message from Rosa Kennedy MA sent at 3/31/2022  1:46 PM EDT -----  Subject: Referral Request    QUESTIONS   Reason for referral request? Dermatology   Has the physician seen you for this condition before? Yes  Select a date? 2021-11-22  Select the Provider the patient wants to be referred to, if known (PCP or   Specialist)? Outside Physician - No one specific   Preferred Specialist (if applicable)? Do you already have an appointment scheduled? No  Additional Information for Provider?   ---------------------------------------------------------------------------  --------------  CALL BACK INFO  What is the best way for the office to contact you? OK to leave message on   voicemail  Preferred Call Back Phone Number?  [phone]

## 2022-04-04 NOTE — TELEPHONE ENCOUNTER
Patient is calling back because he cannot see Dr. David Hickman until July. Any other suggestions? Please call him at [phone] with any other names or numbers.

## 2022-04-05 NOTE — TELEPHONE ENCOUNTER
Pt given the information to the referral that was placed     Sent via email ( patient's preference )

## 2023-09-30 ENCOUNTER — HOSPITAL ENCOUNTER (EMERGENCY)
Age: 63
Discharge: HOME | End: 2023-09-30
Attending: EMERGENCY MEDICINE
Payer: COMMERCIAL

## 2023-09-30 ENCOUNTER — APPOINTMENT (OUTPATIENT)
Dept: GENERAL RADIOLOGY | Age: 63
End: 2023-09-30
Payer: COMMERCIAL

## 2023-09-30 VITALS
HEART RATE: 74 BPM | SYSTOLIC BLOOD PRESSURE: 145 MMHG | DIASTOLIC BLOOD PRESSURE: 95 MMHG | TEMPERATURE: 97.7 F | OXYGEN SATURATION: 99 % | HEIGHT: 70 IN | WEIGHT: 211.8 LBS | RESPIRATION RATE: 21 BRPM | BODY MASS INDEX: 30.32 KG/M2

## 2023-09-30 DIAGNOSIS — M54.50 ACUTE RIGHT-SIDED LOW BACK PAIN WITHOUT SCIATICA: ICD-10-CM

## 2023-09-30 DIAGNOSIS — S80.211A ABRASION OF RIGHT KNEE, INITIAL ENCOUNTER: ICD-10-CM

## 2023-09-30 DIAGNOSIS — S46.911A STRAIN OF RIGHT SHOULDER, INITIAL ENCOUNTER: Primary | ICD-10-CM

## 2023-09-30 PROCEDURE — 6360000002 HC RX W HCPCS: Performed by: EMERGENCY MEDICINE

## 2023-09-30 PROCEDURE — 6370000000 HC RX 637 (ALT 250 FOR IP): Performed by: EMERGENCY MEDICINE

## 2023-09-30 PROCEDURE — 96372 THER/PROPH/DIAG INJ SC/IM: CPT

## 2023-09-30 PROCEDURE — 73030 X-RAY EXAM OF SHOULDER: CPT

## 2023-09-30 PROCEDURE — 99284 EMERGENCY DEPT VISIT MOD MDM: CPT

## 2023-09-30 PROCEDURE — 73562 X-RAY EXAM OF KNEE 3: CPT

## 2023-09-30 RX ORDER — LIDOCAINE 4 G/G
1 PATCH TOPICAL ONCE
Status: DISCONTINUED | OUTPATIENT
Start: 2023-09-30 | End: 2023-09-30 | Stop reason: HOSPADM

## 2023-09-30 RX ORDER — METHOCARBAMOL 500 MG/1
750 TABLET, FILM COATED ORAL ONCE
Status: COMPLETED | OUTPATIENT
Start: 2023-09-30 | End: 2023-09-30

## 2023-09-30 RX ORDER — KETOROLAC TROMETHAMINE 30 MG/ML
15 INJECTION, SOLUTION INTRAMUSCULAR; INTRAVENOUS ONCE
Status: COMPLETED | OUTPATIENT
Start: 2023-09-30 | End: 2023-09-30

## 2023-09-30 RX ORDER — LIDOCAINE 50 MG/G
1 PATCH TOPICAL DAILY
Qty: 10 PATCH | Refills: 0 | Status: SHIPPED | OUTPATIENT
Start: 2023-09-30

## 2023-09-30 RX ORDER — NAPROXEN 500 MG/1
500 TABLET ORAL 2 TIMES DAILY WITH MEALS
Qty: 20 TABLET | Refills: 0 | Status: SHIPPED | OUTPATIENT
Start: 2023-09-30

## 2023-09-30 RX ORDER — METHOCARBAMOL 750 MG/1
750 TABLET, FILM COATED ORAL 3 TIMES DAILY PRN
Qty: 30 TABLET | Refills: 0 | Status: SHIPPED | OUTPATIENT
Start: 2023-09-30 | End: 2023-10-10

## 2023-09-30 RX ADMIN — METHOCARBAMOL 750 MG: 500 TABLET ORAL at 13:30

## 2023-09-30 RX ADMIN — KETOROLAC TROMETHAMINE 15 MG: 30 INJECTION, SOLUTION INTRAMUSCULAR; INTRAVENOUS at 13:30

## 2023-09-30 ASSESSMENT — PAIN - FUNCTIONAL ASSESSMENT: PAIN_FUNCTIONAL_ASSESSMENT: 0-10

## 2023-09-30 ASSESSMENT — PAIN SCALES - GENERAL: PAINLEVEL_OUTOF10: 8

## 2023-09-30 NOTE — ED TRIAGE NOTES
Pt c/o chronic lower back pain, right knee pain and numbness and right shoulder. Pt states he was doing good as far as his back pain, a week ago he fell due to his leg giving out and hurt his shoulder. Pt states now he is unable to do much at work.  Rates pain 8/10

## 2023-09-30 NOTE — ED NOTES
Discharge instructions reviewed with patient not additional comments or concerns.       Carol Bryan RN  09/30/23 3005

## 2023-10-09 ENCOUNTER — TELEPHONE (OUTPATIENT)
Dept: INTERNAL MEDICINE CLINIC | Age: 63
End: 2023-10-09

## 2023-10-09 NOTE — TELEPHONE ENCOUNTER
----- Message from [employer] sent at 10/6/2023  3:37 PM EDT -----  Subject: Appointment Request    Reason for Call: Established Patient Appointment needed: Routine ED Follow   Up Visit    QUESTIONS    Reason for appointment request? No appointments available during search     Additional Information for Provider?  Patient needs a hospital f/u he was   seen for his back pain he said he is still having same pain   ---------------------------------------------------------------------------  --------------  [address] [place]  [phone]; OK to leave message on voicemail  ---------------------------------------------------------------------------  --------------  SCRIPT ANSWERS

## 2024-03-20 ENCOUNTER — OFFICE VISIT (OUTPATIENT)
Dept: INTERNAL MEDICINE CLINIC | Age: 64
End: 2024-03-20
Payer: COMMERCIAL

## 2024-03-20 VITALS
SYSTOLIC BLOOD PRESSURE: 112 MMHG | OXYGEN SATURATION: 97 % | HEIGHT: 69 IN | DIASTOLIC BLOOD PRESSURE: 74 MMHG | BODY MASS INDEX: 30.69 KG/M2 | WEIGHT: 207.2 LBS | HEART RATE: 78 BPM

## 2024-03-20 DIAGNOSIS — Z12.5 SCREENING PSA (PROSTATE SPECIFIC ANTIGEN): ICD-10-CM

## 2024-03-20 DIAGNOSIS — I25.9 CHRONIC ISCHEMIC HEART DISEASE: ICD-10-CM

## 2024-03-20 DIAGNOSIS — Z12.11 SCREEN FOR COLON CANCER: ICD-10-CM

## 2024-03-20 DIAGNOSIS — M79.671 PAIN OF RIGHT HEEL: Primary | ICD-10-CM

## 2024-03-20 DIAGNOSIS — R73.9 HYPERGLYCEMIA: ICD-10-CM

## 2024-03-20 PROCEDURE — 99214 OFFICE O/P EST MOD 30 MIN: CPT | Performed by: INTERNAL MEDICINE

## 2024-03-20 RX ORDER — ROSUVASTATIN CALCIUM 10 MG/1
10 TABLET, COATED ORAL NIGHTLY
Qty: 90 TABLET | Refills: 1 | Status: SHIPPED | OUTPATIENT
Start: 2024-03-20 | End: 2024-09-16

## 2024-03-20 RX ORDER — ASPIRIN 81 MG/1
81 TABLET ORAL DAILY
Qty: 90 TABLET | Refills: 1 | Status: SHIPPED | OUTPATIENT
Start: 2024-03-20

## 2024-03-20 SDOH — ECONOMIC STABILITY: FOOD INSECURITY: WITHIN THE PAST 12 MONTHS, THE FOOD YOU BOUGHT JUST DIDN'T LAST AND YOU DIDN'T HAVE MONEY TO GET MORE.: NEVER TRUE

## 2024-03-20 SDOH — ECONOMIC STABILITY: INCOME INSECURITY: HOW HARD IS IT FOR YOU TO PAY FOR THE VERY BASICS LIKE FOOD, HOUSING, MEDICAL CARE, AND HEATING?: NOT HARD AT ALL

## 2024-03-20 SDOH — ECONOMIC STABILITY: HOUSING INSECURITY
IN THE LAST 12 MONTHS, WAS THERE A TIME WHEN YOU DID NOT HAVE A STEADY PLACE TO SLEEP OR SLEPT IN A SHELTER (INCLUDING NOW)?: NO

## 2024-03-20 SDOH — ECONOMIC STABILITY: FOOD INSECURITY: WITHIN THE PAST 12 MONTHS, YOU WORRIED THAT YOUR FOOD WOULD RUN OUT BEFORE YOU GOT MONEY TO BUY MORE.: NEVER TRUE

## 2024-03-20 ASSESSMENT — PATIENT HEALTH QUESTIONNAIRE - PHQ9
SUM OF ALL RESPONSES TO PHQ QUESTIONS 1-9: 0
SUM OF ALL RESPONSES TO PHQ QUESTIONS 1-9: 0
SUM OF ALL RESPONSES TO PHQ9 QUESTIONS 1 & 2: 0
SUM OF ALL RESPONSES TO PHQ QUESTIONS 1-9: 0
2. FEELING DOWN, DEPRESSED OR HOPELESS: NOT AT ALL
1. LITTLE INTEREST OR PLEASURE IN DOING THINGS: NOT AT ALL
SUM OF ALL RESPONSES TO PHQ QUESTIONS 1-9: 0

## 2024-03-20 NOTE — PATIENT INSTRUCTIONS
Xray  [address]- podiatry- [phone]    --  Stretches and ice  the area and apply topical voltaren gel  --  Labs and cologuard

## 2024-03-20 NOTE — PROGRESS NOTES
Carlton Rodrigues (:  1960) is a 64 y.o. male, here for evaluation of the following chief complaint(s):    Foot Pain (Right Heel pain) and Eye Problem (Left eye cloudiness, been going on about 3-4 months)      ASSESSMENT/PLAN:  1. Pain of right heel  Advised stretches and ice the area and apply topical voltaren gel.  See podiatry if not improving  -     XR FOOT RIGHT (MIN 3 VIEWS); Future  2. Chronic ischemic heart disease  Resume aspirin and start Crestor.  -     Lipid Panel; Future  -     Comprehensive Metabolic Panel; Future  3. Screening PSA (prostate specific antigen)  -     PSA Screening; Future  4. Screen for colon cancer  -     Fecal DNA Colorectal cancer screening (Cologuard)  5. Hyperglycemia  -     Hemoglobin A1C; Future      Return in about 6 months (around 2024).    SUBJECTIVE/OBJECTIVE:  HPI  Patient complains of right heel pain for 4 months.  He denies injury.  He states it is worst first thing in the morning when he gets out of bed.  It improves with walking.  The discomfort tends to stay all day.    He has not been compliant with his cardiology medications.  He denies chest pain or shortness of breath.    Review of Systems  Left eye cloudiness    Past Medical History:   Diagnosis Date    Basal cell carcinoma     CAD (coronary artery disease)     Hyperlipidemia     Inguinal hernia     Knee osteoarthritis     Pre-diabetes     STEMI (ST elevation myocardial infarction) (HCC) 2019    inferior STEMI; subtotal RCA stented with 4 x 23 mm BMS. EF 20-25%, repeat 2019=55%       Current Outpatient Medications   Medication Sig Dispense Refill    aspirin 81 MG EC tablet Take 1 tablet by mouth daily 90 tablet 1    rosuvastatin (CRESTOR) 10 MG tablet Take 1 tablet by mouth nightly 90 tablet 1     No current facility-administered medications for this visit.       Physical Exam  Vitals reviewed.   Constitutional:       General: He is not in acute distress.  HENT:      Head: Normocephalic and

## 2024-03-21 LAB
ALBUMIN SERPL-MCNC: 4.3 G/DL (ref 3.4–5)
ALBUMIN/GLOB SERPL: 1.6 {RATIO} (ref 1.1–2.2)
ALP SERPL-CCNC: 112 U/L (ref 40–129)
ALT SERPL-CCNC: 32 U/L (ref 10–40)
ANION GAP SERPL CALCULATED.3IONS-SCNC: 12 MMOL/L (ref 3–16)
AST SERPL-CCNC: 29 U/L (ref 15–37)
BILIRUB SERPL-MCNC: <0.2 MG/DL (ref 0–1)
BUN SERPL-MCNC: 18 MG/DL (ref 7–20)
CALCIUM SERPL-MCNC: 9.1 MG/DL (ref 8.3–10.6)
CHLORIDE SERPL-SCNC: 101 MMOL/L (ref 99–110)
CHOLEST SERPL-MCNC: 166 MG/DL (ref 0–199)
CO2 SERPL-SCNC: 24 MMOL/L (ref 21–32)
CREAT SERPL-MCNC: 0.8 MG/DL (ref 0.8–1.3)
GFR SERPLBLD CREATININE-BSD FMLA CKD-EPI: >60 ML/MIN/{1.73_M2}
GLUCOSE SERPL-MCNC: 115 MG/DL (ref 70–99)
HDLC SERPL-MCNC: 33 MG/DL (ref 40–60)
LDLC SERPL CALC-MCNC: 99 MG/DL
POTASSIUM SERPL-SCNC: 4.3 MMOL/L (ref 3.5–5.1)
PROT SERPL-MCNC: 7 G/DL (ref 6.4–8.2)
PSA SERPL DL<=0.01 NG/ML-MCNC: 2.92 NG/ML (ref 0–4)
SODIUM SERPL-SCNC: 137 MMOL/L (ref 136–145)
TRIGL SERPL-MCNC: 170 MG/DL (ref 0–150)
VLDLC SERPL CALC-MCNC: 34 MG/DL

## 2024-03-22 ENCOUNTER — HOSPITAL ENCOUNTER (OUTPATIENT)
Dept: GENERAL RADIOLOGY | Age: 64
Discharge: HOME | End: 2024-03-22
Payer: COMMERCIAL

## 2024-03-22 DIAGNOSIS — M79.671 PAIN OF RIGHT HEEL: ICD-10-CM

## 2024-03-22 PROCEDURE — 73630 X-RAY EXAM OF FOOT: CPT

## 2024-04-01 ENCOUNTER — TELEPHONE (OUTPATIENT)
Dept: INTERNAL MEDICINE CLINIC | Age: 64
End: 2024-04-01

## 2024-04-01 NOTE — TELEPHONE ENCOUNTER
----- Message from Alisha Wilson MD sent at 3/24/2024 11:26 PM EDT -----  I didn't see patient's complaint of left eye cloudiness at our last visit. Please advise him to see his eye doctor. He can call [facility]  [phone] if he doesn't have one.

## 2024-04-01 NOTE — TELEPHONE ENCOUNTER
Spoke with patient. Advised him to see an eye dr & offered ROBERT's phone number. Patient states she has too much going on right now with his foot & now having a shoulder issue.

## 2024-04-06 LAB — NONINV COLON CA DNA+OCC BLD SCRN STL QL: NORMAL

## 2024-04-29 ENCOUNTER — TELEPHONE (OUTPATIENT)
Dept: INTERNAL MEDICINE CLINIC | Age: 64
End: 2024-04-29

## 2024-04-29 NOTE — TELEPHONE ENCOUNTER
LVM for pt to call back and see what option he would like from Dr. Wilson, provided ortho clinic after hours information.

## 2024-04-29 NOTE — TELEPHONE ENCOUNTER
----- Message from Alma Esteban Reyes sent at 4/29/2024 10:13 AM EDT -----  Regarding: ECC Escalation To Practice  ECC Escalation To Practice      Type of Escalation: Acute Care Symptom  --------------------------------------------------------------------------------------------------------------------------    Information for Provider:  Patient is looking for appointment for: Symptom   shoulder  pain left/ 3 weeks a ago back and necvk also in pain due to muscle pain from shoulder  Reasons for Message: Unable to reach practice     Additional Information upon scheduling there is no available schedule found on the scheduling window  --------------------------------------------------------------------------------------------------------------------------    Relationship to Patient: Self     Call Back Info: OK to leave message on voicemail  Preferred Call Back Number: Phone [phone] (home)

## 2024-04-29 NOTE — TELEPHONE ENCOUNTER
----- Message from Sathish Escalona sent at 4/29/2024  9:59 AM EDT -----  Regarding: ECC Escalation To Practice  ECC Escalation To Practice      Type of Escalation: Acute Care Symptom  --------------------------------------------------------------------------------------------------------------------------    Information for Provider:  Patient is looking for appointment for: Symptom problem with his shoulder hurt it pretty badly for tree weeks ago  Reasons for Message: Patient disconnected     Additional Information problem with his shoulder hurt it pretty badly for tree weeks ago  --------------------------------------------------------------------------------------------------------------------------    Relationship to Patient: Self     Call Back Info: OK to leave message on voicemail  Preferred Call Back Number: Phone   [phone]

## 2024-04-29 NOTE — TELEPHONE ENCOUNTER
If wants to be seen tomorrow, is there any availability with alternate MD? If not, please schedule him with me for Thursday 3:45 problem focused visit. Or he can go to [facility] after hours urgent care. Give him their contact info.

## 2024-05-08 ENCOUNTER — OFFICE VISIT (OUTPATIENT)
Dept: ORTHOPEDIC SURGERY | Age: 64
End: 2024-05-08

## 2024-05-08 VITALS — HEIGHT: 69 IN | BODY MASS INDEX: 30.66 KG/M2 | WEIGHT: 207 LBS

## 2024-05-08 DIAGNOSIS — M25.512 CHRONIC LEFT SHOULDER PAIN: Primary | ICD-10-CM

## 2024-05-08 DIAGNOSIS — G89.29 CHRONIC LEFT SHOULDER PAIN: Primary | ICD-10-CM

## 2024-05-08 PROCEDURE — 99214 OFFICE O/P EST MOD 30 MIN: CPT | Performed by: PHYSICIAN ASSISTANT

## 2024-05-08 NOTE — PROGRESS NOTES
Date:  2024    Name:  Carlton Rodrigues  Address:  [address] [place] [address] [address]  [facility] [id number]    :  1960      Age:   64 y.o.    SSN:  [id number]      Medical Record Number:  [id number]    Reason for Visit:    Chief Complaint    Pain (Left shoulder)      DOS:2024     HPI: Carlton Rodrigues is a 64 y.o. male here today for evaluation of his left shoulder.  Patient states that he was throwing pallets and hit a beam felt sharp pain in his left shoulder this happened a month ago.  His shoulder has not been the same since.  He has pain with any type of overhead motion or lifting and carrying.  He does endorse a distant history of a proximal bicep rupture with no surgical fixation on the left explaining a Karthik deformity.      Pain Assessment  Location of Pain: Shoulder  Location Modifiers: Left, Superior  Severity of Pain: 3  Quality of Pain: Aching, Sharp  Duration of Pain: Persistent  Frequency of Pain: Constant  Date Pain First Started:  (1 month ago)  Aggravating Factors: Other (Comment) (raising arm, lifting)  Limiting Behavior: Some  Relieving Factors: Nsaids  Result of Injury: Yes  Work-Related Injury: Yes  Are there other pain locations you wish to document?: No  ROS: Review of systems reviewed from Patient History Form completed today and available in the patient's chart under the Media tab.       Past Medical History:   Diagnosis Date    Basal cell carcinoma     CAD (coronary artery disease)     Hyperlipidemia     Inguinal hernia     Knee osteoarthritis     Pre-diabetes     STEMI (ST elevation myocardial infarction) ([facility]) 2019    inferior STEMI; subtotal RCA stented with 4 x 23 mm BMS. EF 20-25%, repeat 2019=55%        Past Surgical History:   Procedure Laterality Date    COLONOSCOPY      ?    CORONARY ANGIOPLASTY WITH STENT PLACEMENT      HAND SURGERY Left     in his 30s    HERNIA REPAIR Left 2022    LAPAROSCOPIC LEFT INGUINAL HERNIA REPAIR performed by Venu Nielsen MD at

## 2024-05-09 ENCOUNTER — TELEPHONE (OUTPATIENT)
Dept: ORTHOPEDIC SURGERY | Age: 64
End: 2024-05-09

## 2024-05-09 NOTE — TELEPHONE ENCOUNTER
Lvm for iw in regards to obtaining [facility] info. I called [employer] and they were unable to locate a workers comp claim.

## 2024-05-10 ENCOUNTER — OFFICE VISIT (OUTPATIENT)
Dept: ORTHOPEDIC SURGERY | Age: 64
End: 2024-05-10
Payer: COMMERCIAL

## 2024-05-10 VITALS — HEIGHT: 69 IN | WEIGHT: 203 LBS | BODY MASS INDEX: 30.07 KG/M2

## 2024-05-10 DIAGNOSIS — S46.112D RUPTURE OF TENDON OF BICEPS, LONG HEAD, LEFT, SUBSEQUENT ENCOUNTER: Primary | ICD-10-CM

## 2024-05-10 PROCEDURE — 99213 OFFICE O/P EST LOW 20 MIN: CPT | Performed by: STUDENT IN AN ORGANIZED HEALTH CARE EDUCATION/TRAINING PROGRAM

## 2024-05-10 NOTE — PROGRESS NOTES
CHIEF COMPLAINT: Left shoulder pain    DATE OF ONSET: Early April 2024    History:    Carlton Rodrigues is a 64 y.o. right handed male referred by Tiffanie Izaguirre PA-C from after hours clinic for evaluation and treatment of Left shoulder pain. This is evaluated as a personal injury. The pain began 1.5 months ago.  Pain is rated as a 6/10.   There was an injury. He was throwing pallets and hit a beam felt sharp pain in his left shoulder. The pain is located anterior and superior shoulder. It is worse with overhead motion and reaching behind his back. He feels weak.   The patient has not had PT. The patient has not had an injection. The patient has not tried NSAIDs. The patient has not tried ice.    Outside reports reviewed:  office notes.    Past Medical History:   Diagnosis Date    Basal cell carcinoma     CAD (coronary artery disease)     Hyperlipidemia     Inguinal hernia     Knee osteoarthritis     Pre-diabetes     STEMI (ST elevation myocardial infarction) ([facility]) 05/2019    inferior STEMI; subtotal RCA stented with 4 x 23 mm BMS. EF 20-25%, repeat 7/2019=55%       Past Surgical History:   Procedure Laterality Date    COLONOSCOPY  2012    ?    CORONARY ANGIOPLASTY WITH STENT PLACEMENT  2019    HAND SURGERY Left     in his 30s    HERNIA REPAIR Left 02/16/2022    LAPAROSCOPIC LEFT INGUINAL HERNIA REPAIR performed by Venu Nielsen MD at [facility] OR    SHOULDER SURGERY Left     in his 30s       Current Outpatient Medications on File Prior to Visit   Medication Sig Dispense Refill    aspirin 81 MG EC tablet Take 1 tablet by mouth daily 90 tablet 1    rosuvastatin (CRESTOR) 10 MG tablet Take 1 tablet by mouth nightly 90 tablet 1     No current facility-administered medications on file prior to visit.       No Known Allergies    Social History     Socioeconomic History    Marital status: Single     Spouse name: Not on file    Number of children: 5    Years of education: Not on file    Highest education level: Not on file

## 2024-05-21 ENCOUNTER — TELEPHONE (OUTPATIENT)
Dept: ORTHOPEDIC SURGERY | Age: 64
End: 2024-05-21

## 2024-05-21 NOTE — TELEPHONE ENCOUNTER
Called and lvm for iw to verify if he has notified his employer of his work injury. [facility] [employer] does not have any workers comp information to provide at this time.

## 2024-05-24 ENCOUNTER — TELEPHONE (OUTPATIENT)
Dept: ORTHOPEDIC SURGERY | Age: 64
End: 2024-05-24

## 2024-05-24 NOTE — TELEPHONE ENCOUNTER
Called and lvm for iw to give me a call back at his earliest.    Spoke with Colby BLANCO with [employer], he stated iw has not completed an injury report and that's how the claim gets pushed through. He will reach out to Carlton's mgr to make him aware as well if he's not already.

## 2024-06-04 ENCOUNTER — TELEPHONE (OUTPATIENT)
Dept: ORTHOPEDIC SURGERY | Age: 64
End: 2024-06-04

## 2024-06-04 NOTE — TELEPHONE ENCOUNTER
[employer] / WORK[employer]:    Caller: LORNE STARKS    Phone#: [phone]    Fax#: [phone]    [employer] Date of Service:  5.10.2024    REASON FOR CALL:         [employer] Form Missing

## (undated) DEVICE — TROCAR: Brand: KII SHIELDED BLADED ACCESS SYSTEM

## (undated) DEVICE — GENERAL LAPAROSCOPIC: Brand: MEDLINE INDUSTRIES, INC.

## (undated) DEVICE — LOOP LIG SUT SZ 0 L18IN ABSRB POLYDIOXANONE MFIL PDS II

## (undated) DEVICE — PUMP SUC IRR TBNG L10FT W/ HNDPC ASSEMB STRYKEFLOW 2

## (undated) DEVICE — TROCARS: Brand: KII® BALLOON BLUNT TIP SYSTEM

## (undated) DEVICE — E-Z CLEAN, NON-STICK, PTFE COATED, ELECTROSURGICAL BLADE ELECTRODE, MODIFIED EXTENDED INSULATION, 2.5 INCH (6.35 CM): Brand: MEGADYNE

## (undated) DEVICE — APPLICATOR PREP 26ML 0.7% IOD POVACRYLEX 74% ISO ALC ST

## (undated) DEVICE — TOWEL,STOP FLAG GOLD N-W: Brand: MEDLINE

## (undated) DEVICE — SUTURE MCRYL SZ 4-0 L18IN ABSRB UD L19MM PS-2 3/8 CIR PRIM Y496G

## (undated) DEVICE — GLOVE SURG SZ 7 L12IN FNGR THK75MIL WHT LTX POLYMER BEAD

## (undated) DEVICE — CORD ES L10FT MPLR LAP

## (undated) DEVICE — KIT,ANTI FOG,W/SPONGE & FLUID,SOFT PACK: Brand: MEDLINE

## (undated) DEVICE — SUTURE VCRL SZ 0 L27IN ABSRB UD L26MM CT-2 1/2 CIR J270H

## (undated) DEVICE — SCISSORS ENDOSCP DIA5MM CRV MPLR CAUT W/ RATCH HNDL